# Patient Record
Sex: FEMALE | Race: WHITE | NOT HISPANIC OR LATINO | Employment: FULL TIME
[De-identification: names, ages, dates, MRNs, and addresses within clinical notes are randomized per-mention and may not be internally consistent; named-entity substitution may affect disease eponyms.]

---

## 2017-06-10 ENCOUNTER — HEALTH MAINTENANCE LETTER (OUTPATIENT)
Age: 27
End: 2017-06-10

## 2018-03-06 ENCOUNTER — APPOINTMENT (OUTPATIENT)
Dept: CT IMAGING | Facility: CLINIC | Age: 28
End: 2018-03-06
Attending: EMERGENCY MEDICINE
Payer: COMMERCIAL

## 2018-03-06 ENCOUNTER — APPOINTMENT (OUTPATIENT)
Dept: ULTRASOUND IMAGING | Facility: CLINIC | Age: 28
End: 2018-03-06
Attending: EMERGENCY MEDICINE
Payer: COMMERCIAL

## 2018-03-06 ENCOUNTER — HOSPITAL ENCOUNTER (EMERGENCY)
Facility: CLINIC | Age: 28
Discharge: HOME | End: 2018-03-06
Attending: EMERGENCY MEDICINE | Admitting: EMERGENCY MEDICINE
Payer: COMMERCIAL

## 2018-03-06 VITALS
OXYGEN SATURATION: 100 % | DIASTOLIC BLOOD PRESSURE: 74 MMHG | WEIGHT: 166 LBS | HEART RATE: 70 BPM | SYSTOLIC BLOOD PRESSURE: 121 MMHG | BODY MASS INDEX: 23.15 KG/M2 | RESPIRATION RATE: 16 BRPM | TEMPERATURE: 98.4 F

## 2018-03-06 DIAGNOSIS — R10.30 LOWER ABDOMINAL PAIN: ICD-10-CM

## 2018-03-06 LAB
ALBUMIN SERPL-MCNC: 4.1 G/DL (ref 3.4–5)
ALBUMIN UR-MCNC: NEGATIVE MG/DL
ALP SERPL-CCNC: 57 U/L (ref 40–150)
ALT SERPL W P-5'-P-CCNC: 17 U/L (ref 0–50)
ANION GAP SERPL CALCULATED.3IONS-SCNC: 7 MMOL/L (ref 3–14)
APPEARANCE UR: CLEAR
AST SERPL W P-5'-P-CCNC: 18 U/L (ref 0–45)
BASOPHILS # BLD AUTO: 0 10E9/L (ref 0–0.2)
BASOPHILS NFR BLD AUTO: 0.2 %
BILIRUB SERPL-MCNC: 0.4 MG/DL (ref 0.2–1.3)
BILIRUB UR QL STRIP: NEGATIVE
BUN SERPL-MCNC: 11 MG/DL (ref 7–30)
CALCIUM SERPL-MCNC: 8.7 MG/DL (ref 8.5–10.1)
CHLORIDE SERPL-SCNC: 107 MMOL/L (ref 94–109)
CO2 SERPL-SCNC: 29 MMOL/L (ref 20–32)
COLOR UR AUTO: ABNORMAL
CREAT SERPL-MCNC: 0.53 MG/DL (ref 0.52–1.04)
DIFFERENTIAL METHOD BLD: NORMAL
EOSINOPHIL # BLD AUTO: 0.1 10E9/L (ref 0–0.7)
EOSINOPHIL NFR BLD AUTO: 2.1 %
ERYTHROCYTE [DISTWIDTH] IN BLOOD BY AUTOMATED COUNT: 13 % (ref 10–15)
GFR SERPL CREATININE-BSD FRML MDRD: >90 ML/MIN/1.7M2
GLUCOSE SERPL-MCNC: 65 MG/DL (ref 70–99)
GLUCOSE UR STRIP-MCNC: NEGATIVE MG/DL
HCG SERPL QL: NEGATIVE
HCT VFR BLD AUTO: 43 % (ref 35–47)
HGB BLD-MCNC: 14.4 G/DL (ref 11.7–15.7)
HGB UR QL STRIP: ABNORMAL
IMM GRANULOCYTES # BLD: 0 10E9/L (ref 0–0.4)
IMM GRANULOCYTES NFR BLD: 0.2 %
KETONES UR STRIP-MCNC: NEGATIVE MG/DL
LEUKOCYTE ESTERASE UR QL STRIP: NEGATIVE
LIPASE SERPL-CCNC: 168 U/L (ref 73–393)
LYMPHOCYTES # BLD AUTO: 1.3 10E9/L (ref 0.8–5.3)
LYMPHOCYTES NFR BLD AUTO: 23.1 %
MCH RBC QN AUTO: 31.4 PG (ref 26.5–33)
MCHC RBC AUTO-ENTMCNC: 33.5 G/DL (ref 31.5–36.5)
MCV RBC AUTO: 94 FL (ref 78–100)
MONOCYTES # BLD AUTO: 0.4 10E9/L (ref 0–1.3)
MONOCYTES NFR BLD AUTO: 7.3 %
MUCOUS THREADS #/AREA URNS LPF: PRESENT /LPF
NEUTROPHILS # BLD AUTO: 3.9 10E9/L (ref 1.6–8.3)
NEUTROPHILS NFR BLD AUTO: 67.1 %
NITRATE UR QL: NEGATIVE
NRBC # BLD AUTO: 0 10*3/UL
NRBC BLD AUTO-RTO: 0 /100
PH UR STRIP: 5.5 PH (ref 5–7)
PLATELET # BLD AUTO: 271 10E9/L (ref 150–450)
POTASSIUM SERPL-SCNC: 3.9 MMOL/L (ref 3.4–5.3)
PROT SERPL-MCNC: 8.6 G/DL (ref 6.8–8.8)
RBC # BLD AUTO: 4.58 10E12/L (ref 3.8–5.2)
RBC #/AREA URNS AUTO: 17 /HPF (ref 0–2)
SODIUM SERPL-SCNC: 143 MMOL/L (ref 133–144)
SOURCE: ABNORMAL
SP GR UR STRIP: 1.01 (ref 1–1.03)
SPECIMEN SOURCE: NORMAL
SQUAMOUS #/AREA URNS AUTO: 2 /HPF (ref 0–1)
UROBILINOGEN UR STRIP-MCNC: NORMAL MG/DL (ref 0–2)
WBC # BLD AUTO: 5.8 10E9/L (ref 4–11)
WBC #/AREA URNS AUTO: 1 /HPF (ref 0–5)
WET PREP SPEC: NORMAL

## 2018-03-06 PROCEDURE — 87591 N.GONORRHOEAE DNA AMP PROB: CPT | Performed by: EMERGENCY MEDICINE

## 2018-03-06 PROCEDURE — 96375 TX/PRO/DX INJ NEW DRUG ADDON: CPT | Performed by: EMERGENCY MEDICINE

## 2018-03-06 PROCEDURE — 84703 CHORIONIC GONADOTROPIN ASSAY: CPT | Performed by: EMERGENCY MEDICINE

## 2018-03-06 PROCEDURE — 99284 EMERGENCY DEPT VISIT MOD MDM: CPT | Mod: 25 | Performed by: EMERGENCY MEDICINE

## 2018-03-06 PROCEDURE — 74177 CT ABD & PELVIS W/CONTRAST: CPT

## 2018-03-06 PROCEDURE — 99284 EMERGENCY DEPT VISIT MOD MDM: CPT | Mod: Z6 | Performed by: EMERGENCY MEDICINE

## 2018-03-06 PROCEDURE — 87210 SMEAR WET MOUNT SALINE/INK: CPT | Performed by: EMERGENCY MEDICINE

## 2018-03-06 PROCEDURE — 83690 ASSAY OF LIPASE: CPT | Performed by: EMERGENCY MEDICINE

## 2018-03-06 PROCEDURE — 96374 THER/PROPH/DIAG INJ IV PUSH: CPT | Mod: 59 | Performed by: EMERGENCY MEDICINE

## 2018-03-06 PROCEDURE — 87491 CHLMYD TRACH DNA AMP PROBE: CPT | Performed by: EMERGENCY MEDICINE

## 2018-03-06 PROCEDURE — 80053 COMPREHEN METABOLIC PANEL: CPT | Performed by: EMERGENCY MEDICINE

## 2018-03-06 PROCEDURE — 25000125 ZZHC RX 250: Performed by: EMERGENCY MEDICINE

## 2018-03-06 PROCEDURE — 25000128 H RX IP 250 OP 636: Performed by: EMERGENCY MEDICINE

## 2018-03-06 PROCEDURE — 81001 URINALYSIS AUTO W/SCOPE: CPT | Performed by: EMERGENCY MEDICINE

## 2018-03-06 PROCEDURE — 85025 COMPLETE CBC W/AUTO DIFF WBC: CPT | Performed by: EMERGENCY MEDICINE

## 2018-03-06 PROCEDURE — 93976 VASCULAR STUDY: CPT

## 2018-03-06 RX ORDER — ONDANSETRON 2 MG/ML
4 INJECTION INTRAMUSCULAR; INTRAVENOUS ONCE
Status: COMPLETED | OUTPATIENT
Start: 2018-03-06 | End: 2018-03-06

## 2018-03-06 RX ORDER — IOPAMIDOL 755 MG/ML
100 INJECTION, SOLUTION INTRAVASCULAR ONCE
Status: COMPLETED | OUTPATIENT
Start: 2018-03-06 | End: 2018-03-06

## 2018-03-06 RX ORDER — KETOROLAC TROMETHAMINE 30 MG/ML
30 INJECTION, SOLUTION INTRAMUSCULAR; INTRAVENOUS ONCE
Status: COMPLETED | OUTPATIENT
Start: 2018-03-06 | End: 2018-03-06

## 2018-03-06 RX ADMIN — KETOROLAC TROMETHAMINE 30 MG: 30 INJECTION, SOLUTION INTRAMUSCULAR at 17:25

## 2018-03-06 RX ADMIN — IOPAMIDOL 81 ML: 755 INJECTION, SOLUTION INTRAVENOUS at 19:27

## 2018-03-06 RX ADMIN — SODIUM CHLORIDE 1000 ML: 9 INJECTION, SOLUTION INTRAVENOUS at 19:57

## 2018-03-06 RX ADMIN — ONDANSETRON 4 MG: 2 INJECTION INTRAMUSCULAR; INTRAVENOUS at 17:25

## 2018-03-06 RX ADMIN — SODIUM CHLORIDE 20 ML: 9 INJECTION, SOLUTION INTRAVENOUS at 19:27

## 2018-03-06 ASSESSMENT — ENCOUNTER SYMPTOMS
FEVER: 0
NAUSEA: 1
DIFFICULTY URINATING: 0
NECK STIFFNESS: 0
SHORTNESS OF BREATH: 0
HEADACHES: 0
ARTHRALGIAS: 0
ABDOMINAL PAIN: 1
CONFUSION: 0
EYE REDNESS: 0
COLOR CHANGE: 0

## 2018-03-06 NOTE — ED PROVIDER NOTES
"  History     Chief Complaint   Patient presents with     Abdominal Pain     Onset one hour ago while sitting at work with lower abdominal pain, \"I am on day 2 of my period and I have a history of ovarian cysts\" nausea and feeling hot.     HPI  Elmira Craft is a 27 year old female who presents to the emergency department with lower abdominal pain.  Patient states that she has had lower abdominal pain since she awoke this morning.  It became worse approximately 1-1/2 hours prior to presentation.  She did have some nausea but denies any vomiting.  She also reports that she felt flushed earlier today.  Patient states that she had 2 soft bowel movements today.  She denies any fever.  Patient denies any cough or dyspnea.  She did take a pill for a vaginal yeast infection yesterday.  She is uncertain of the name.  Patient denies any dysuria, urgency, or frequency.  She states that she is currently menstruating.  She has a history of ovarian cyst and cystectomy previously.  She denies any other abdominal surgery.    I have reviewed the Medications, Allergies, Past Medical and Surgical History, and Social History in the Epic system.    Review of Systems   Constitutional: Negative for fever.   HENT: Negative for congestion.    Eyes: Negative for redness.   Respiratory: Negative for shortness of breath.    Cardiovascular: Negative for chest pain.   Gastrointestinal: Positive for abdominal pain and nausea.   Genitourinary: Negative for difficulty urinating.   Musculoskeletal: Negative for arthralgias and neck stiffness.   Skin: Negative for color change.   Neurological: Negative for headaches.   Psychiatric/Behavioral: Negative for confusion.   All other systems reviewed and are negative.      Physical Exam   BP: 131/83  Pulse: 70  Heart Rate: 70  Temp: 96.2  F (35.7  C)  Resp: 16  Weight: 75.3 kg (166 lb)  SpO2: 100 %      Physical Exam   Constitutional: She is oriented to person, place, and time. She appears " well-developed and well-nourished. No distress.   HENT:   Head: Atraumatic.   Mouth/Throat: No oropharyngeal exudate.   Eyes: No scleral icterus.   Neck: Normal range of motion. Neck supple.   Cardiovascular: Normal rate, regular rhythm, normal heart sounds and intact distal pulses.    Pulmonary/Chest: Effort normal and breath sounds normal. No respiratory distress.   Abdominal: Soft. Bowel sounds are normal. There is tenderness. There is no rigidity and no guarding.       Genitourinary: Vagina normal and uterus normal. Cervix exhibits no motion tenderness. Right adnexum displays tenderness. Right adnexum displays no mass and no fullness. Left adnexum displays fullness. Left adnexum displays no mass and no tenderness.   Musculoskeletal: Normal range of motion. She exhibits no edema or tenderness.   Neurological: She is alert and oriented to person, place, and time. She has normal strength. Coordination normal.   Skin: Skin is warm. No rash noted. She is not diaphoretic.   Psychiatric: She has a normal mood and affect. Her behavior is normal.   Nursing note and vitals reviewed.      ED Course     ED Course     Procedures            Critical Care time:    Results for orders placed or performed during the hospital encounter of 03/06/18 (from the past 24 hour(s))   CBC with platelets differential   Result Value Ref Range    WBC 5.8 4.0 - 11.0 10e9/L    RBC Count 4.58 3.8 - 5.2 10e12/L    Hemoglobin 14.4 11.7 - 15.7 g/dL    Hematocrit 43.0 35.0 - 47.0 %    MCV 94 78 - 100 fl    MCH 31.4 26.5 - 33.0 pg    MCHC 33.5 31.5 - 36.5 g/dL    RDW 13.0 10.0 - 15.0 %    Platelet Count 271 150 - 450 10e9/L    Diff Method Automated Method     % Neutrophils 67.1 %    % Lymphocytes 23.1 %    % Monocytes 7.3 %    % Eosinophils 2.1 %    % Basophils 0.2 %    % Immature Granulocytes 0.2 %    Nucleated RBCs 0 0 /100    Absolute Neutrophil 3.9 1.6 - 8.3 10e9/L    Absolute Lymphocytes 1.3 0.8 - 5.3 10e9/L    Absolute Monocytes 0.4 0.0 - 1.3  10e9/L    Absolute Eosinophils 0.1 0.0 - 0.7 10e9/L    Absolute Basophils 0.0 0.0 - 0.2 10e9/L    Abs Immature Granulocytes 0.0 0 - 0.4 10e9/L    Absolute Nucleated RBC 0.0    Comprehensive metabolic panel   Result Value Ref Range    Sodium 143 133 - 144 mmol/L    Potassium 3.9 3.4 - 5.3 mmol/L    Chloride 107 94 - 109 mmol/L    Carbon Dioxide 29 20 - 32 mmol/L    Anion Gap 7 3 - 14 mmol/L    Glucose 65 (L) 70 - 99 mg/dL    Urea Nitrogen 11 7 - 30 mg/dL    Creatinine 0.53 0.52 - 1.04 mg/dL    GFR Estimate >90 >60 mL/min/1.7m2    GFR Estimate If Black >90 >60 mL/min/1.7m2    Calcium 8.7 8.5 - 10.1 mg/dL    Bilirubin Total 0.4 0.2 - 1.3 mg/dL    Albumin 4.1 3.4 - 5.0 g/dL    Protein Total 8.6 6.8 - 8.8 g/dL    Alkaline Phosphatase 57 40 - 150 U/L    ALT 17 0 - 50 U/L    AST 18 0 - 45 U/L   Lipase   Result Value Ref Range    Lipase 168 73 - 393 U/L   HCG qualitative pregnancy (blood)   Result Value Ref Range    HCG Qualitative Serum Negative NEG^Negative   UA with Microscopic reflex to Culture   Result Value Ref Range    Color Urine Light Yellow     Appearance Urine Clear     Glucose Urine Negative NEG^Negative mg/dL    Bilirubin Urine Negative NEG^Negative    Ketones Urine Negative NEG^Negative mg/dL    Specific Gravity Urine 1.006 1.003 - 1.035    Blood Urine Moderate (A) NEG^Negative    pH Urine 5.5 5.0 - 7.0 pH    Protein Albumin Urine Negative NEG^Negative mg/dL    Urobilinogen mg/dL Normal 0.0 - 2.0 mg/dL    Nitrite Urine Negative NEG^Negative    Leukocyte Esterase Urine Negative NEG^Negative    Source Midstream Urine     WBC Urine 1 0 - 5 /HPF    RBC Urine 17 (H) 0 - 2 /HPF    Squamous Epithelial /HPF Urine 2 (H) 0 - 1 /HPF    Mucous Urine Present (A) NEG^Negative /LPF   Wet prep   Result Value Ref Range    Specimen Description Vagina     Wet Prep No motile Trichomonas seen     Wet Prep No yeast seen     Wet Prep No PMNs seen     Wet Prep No clue cells seen    US Pelvic Complete w Transvaginal & Abd/Pel Duplex  Limited    Narrative    PELVIC ULTRASOUND WITH ENDOVAGINAL TRANSDUCER    3/6/2018 6:24 PM     HISTORY: Right adnexal pain. Evaluate for torsion.    TECHNIQUE: Endovaginal sonography was added to the transabdominal exam  to better evaluate the uterus and ovaries.    COMPARISON: Pelvic ultrasound 3/1/2016.    FINDINGS: Endometrium is 1 mm thick. Uterus is 5.7 x 4.4 x 2.6 cm.  Ovaries are symmetric in size containing multiple small follicles.  Color Doppler spectral analysis of each ovary shows normal waveforms.  Trace pelvic fluid.      Impression    IMPRESSION: Trace pelvic fluid. No specific acute abnormality.   CT Abdomen Pelvis w Contrast    Narrative    CT ABDOMEN PELVIS WITH CONTRAST   3/6/2018 7:26 PM     HISTORY: Right lower quadrant pain. Evaluate for appendicitis.     TECHNIQUE: CT abdomen and pelvis with 81 mL Isovue-370 IV. Radiation  dose for this scan was reduced using automated exposure control,  adjustment of the mA and/or kV according to patient size, or iterative  reconstruction technique.    COMPARISON: None.    FINDINGS: The appendix is normal. No acute inflammatory change of the  large or small bowel identified. There is no abscess or free air.  Liver, adrenals, spleen, pancreas, and kidneys enhance homogeneously.  Contracted gallbladder.      Impression    IMPRESSION: No acute abnormality is seen. The appendix appears normal.         Medications   0.9% sodium chloride BOLUS (1,000 mLs Intravenous New Bag 3/6/18 1957)   ketorolac (TORADOL) injection 30 mg (30 mg Intravenous Given 3/6/18 1725)   ondansetron (ZOFRAN) injection 4 mg (4 mg Intravenous Given 3/6/18 1725)   iopamidol (ISOVUE-370) solution 100 mL (81 mLs Intravenous Given 3/6/18 1927)   sodium chloride 0.9 % bag 500mL for CT scan flush use (20 mLs As instructed Given 3/6/18 1927)      7:02 PM Repeat exam- Abdomen soft, mild tenderness in the right lower quadrant without guarding.  Discussed results thus far.  Discussed options of repeat  exam in the morning if ongoing right lower quadrant pain for consideration of early appendicitis versus CT imaging now.  As patient's pain now local to right lower quadrant, CT imaging appears reasonable and indicated.  Patient prefers CT scan at this time.         Assessments & Plan (with Medical Decision Making)   27 year old female to emergency room with 1 day history of lower abdominal pain.  Patient had some associated nausea but no vomiting.  Her physical examination is remarkable for some diffuse lower abdominal tenderness initially without peritoneal signs.  She did have some left adnexal fullness and right adnexal tenderness on pelvic exam shows ultrasound was obtained and revealed no acute ovarian pathology.  Her labs and urinalysis are normal.  During the patient's emergency department course, her pain localized to the right side although overall she felt improved after Toradol and fluids.  Because of this localization, patient elected to pursue CT scan which was ultimately normal.  Patient be discharged home.  Over-the-counter analgesics recommended.  Return precautions discussed.  Primary care follow-up recommended    I have reviewed the nursing notes.    I have reviewed the findings, diagnosis, plan and need for follow up with the patient.    New Prescriptions    No medications on file       Final diagnoses:   Lower abdominal pain       3/6/2018   [facility] [place], EMERGENCY DEPARTMENT     Hardeep Siomn MD  03/06/18 [id number]

## 2018-03-06 NOTE — ED AVS SNAPSHOT
[facility], [place], Emergency Department    [address]    [facility] [address]    Phone:  [phone]    Fax:  [phone]                                       Elmira Craft   MRN: [id number]    Department:  [facility], Emergency Department   Date of Visit:  3/6/2018           After Visit Summary Signature Page     I have received my discharge instructions, and my questions have been answered. I have discussed any challenges I see with this plan with the nurse or doctor.    ..........................................................................................................................................  Patient/Patient Representative Signature      ..........................................................................................................................................  Patient Representative Print Name and Relationship to Patient    ..................................................               ................................................  Date                                            Time    ..........................................................................................................................................  Reviewed by Signature/Title    ...................................................              ..............................................  Date                                                            Time

## 2018-03-06 NOTE — ED AVS SNAPSHOT
[facility], Emergency Department    [address]    [place] [address]    Phone:  [phone]    Fax:  [phone]                                       Elmira Craft   MRN: [id number]    Department:  [facility], Emergency Department   Date of Visit:  3/6/2018           Patient Information     Date Of Birth          1990        Your diagnoses for this visit were:     Lower abdominal pain        You were seen by Hardeep Simon MD.        Discharge Instructions       You may take ibuprofen 600 mg every 6 hours with food or acetaminophen as needed for pain.  Drink plenty of fluids.    Please make an appointment to follow up with Your Primary Care Provider in the next few days if not better.     Discharge References/Attachments     ABDOMINAL PAIN, UNKNOWN CAUSE, (FEMALE) (ENGLISH)      24 Hour Appointment Hotline       To make an appointment at any [facility] clinic, call [phone] ([phone]). If you don't have a family doctor or clinic, we will help you find one. [facility] clinics are conveniently located to serve the needs of you and your family.             Review of your medicines      Our records show that you are taking the medicines listed below. If these are incorrect, please call your family doctor or clinic.        Dose / Directions Last dose taken    EXCEDRIN MIGRAINE PO        Take  by mouth as needed.   Refills:  0                Procedures and tests performed during your visit     CBC with platelets differential    CT Abdomen Pelvis w Contrast    Chlamydia trachomatis PCR    Comprehensive metabolic panel    HCG qualitative pregnancy (blood)    Lipase    NO Rho (D) immune globulin (RhoGam) needed - NOT obstetric patient    Neisseria gonorrhoea PCR    Peripheral IV catheter    Prep for procedure - pelvic exam    UA with Microscopic reflex to Culture    US Pelvic Complete w Transvaginal & Abd/Pel Duplex Limited    Wet prep      Orders Needing Specimen Collection     None       Pending Results     Date and Time Order Name Status Description    3/6/2018 1902 CT Abdomen Pelvis w Contrast Preliminary     3/6/2018 1703 US Pelvic Complete w Transvaginal & Abd/Pel Duplex Limited Preliminary     3/6/2018 1557 Neisseria gonorrhoea PCR In process     3/6/2018 1557 Chlamydia trachomatis PCR In process             Pending Culture Results     Date and Time Order Name Status Description    3/6/2018 1557 Neisseria gonorrhoea PCR In process     3/6/2018 1557 Chlamydia trachomatis PCR In process             Pending Results Instructions     If you had any lab results that were not finalized at the time of your Discharge, you can call the ED Lab Result RN at [phone]. You will be contacted by this team for any positive Lab results or changes in treatment. The nurses are available 7 days a week from 10A to 6:30P.  You can leave a message 24 hours per day and they will return your call.        Thank you for choosing [facility]       Thank you for choosing [facility] for your care. Our goal is always to provide you with excellent care. Hearing back from our patients is one way we can continue to improve our services. Please take a few minutes to complete the written survey that you may receive in the mail after you visit with us. Thank you!        [employer] Information     [employer] gives you secure access to your electronic health record. If you see a primary care provider, you can also send messages to your care team and make appointments. If you have questions, please call your primary care clinic.  If you do not have a primary care provider, please call [phone] and they will assist you.        Care EveryWhere ID     This is your Care EveryWhere ID. This could be used by other organizations to access your [facility] medical records  [id number]        Equal Access to Services     REMIGIO ARGUELLO AH: Herminia Shrestha, twila steen, sd mayers  la'lorena kendell. So [facility] [phone].    ATENCIÓN: Si habla español, tiene a cummins disposición servicios gratuitos de asistencia lingüística. Llame al [phone].    We comply with applicable federal civil rights laws and [place] laws. We do not discriminate on the basis of race, color, national origin, age, disability, sex, sexual orientation, or gender identity.            After Visit Summary       This is your record. Keep this with you and show to your community pharmacist(s) and doctor(s) at your next visit.

## 2018-03-06 NOTE — ED NOTES
Pt had surgery for ovarian cyst removal in 2016 but never f/u afterward.  She has diffuse bilateral abdominal pain.  States this pain is different and feels a lot of pressure and bloating.  Also, she is on 2nd day of period and uses a menstrual cup which is in vagina right now.    [facility] and was prescribed something for a yeast infection but she is not sure name of the pill.

## 2018-03-07 LAB
C TRACH DNA SPEC QL NAA+PROBE: NEGATIVE
N GONORRHOEA DNA SPEC QL NAA+PROBE: NEGATIVE
SPECIMEN SOURCE: NORMAL
SPECIMEN SOURCE: NORMAL

## 2018-03-07 NOTE — DISCHARGE INSTRUCTIONS
You may take ibuprofen 600 mg every 6 hours with food or acetaminophen as needed for pain.  Drink plenty of fluids.    Please make an appointment to follow up with Your Primary Care Provider in the next few days if not better.

## 2018-07-23 ENCOUNTER — OFFICE VISIT (OUTPATIENT)
Dept: FAMILY MEDICINE | Facility: CLINIC | Age: 28
End: 2018-07-23
Payer: COMMERCIAL

## 2018-07-23 VITALS
TEMPERATURE: 98.6 F | WEIGHT: 160 LBS | DIASTOLIC BLOOD PRESSURE: 80 MMHG | SYSTOLIC BLOOD PRESSURE: 123 MMHG | RESPIRATION RATE: 16 BRPM | BODY MASS INDEX: 22.4 KG/M2 | HEART RATE: 86 BPM | HEIGHT: 71 IN | OXYGEN SATURATION: 99 %

## 2018-07-23 DIAGNOSIS — N76.0 BACTERIAL VAGINOSIS: Primary | ICD-10-CM

## 2018-07-23 DIAGNOSIS — B96.89 BACTERIAL VAGINOSIS: Primary | ICD-10-CM

## 2018-07-23 LAB
ALBUMIN UR-MCNC: NEGATIVE MG/DL
APPEARANCE UR: CLEAR
BACTERIA #/AREA URNS HPF: ABNORMAL /HPF
BILIRUB UR QL STRIP: NEGATIVE
COLOR UR AUTO: YELLOW
GLUCOSE UR STRIP-MCNC: NEGATIVE MG/DL
HGB UR QL STRIP: NEGATIVE
KETONES UR STRIP-MCNC: NEGATIVE MG/DL
LEUKOCYTE ESTERASE UR QL STRIP: NEGATIVE
NITRATE UR QL: NEGATIVE
NON-SQ EPI CELLS #/AREA URNS LPF: ABNORMAL /LPF
PH UR STRIP: 6 PH (ref 5–7)
RBC #/AREA URNS AUTO: ABNORMAL /HPF
SOURCE: ABNORMAL
SP GR UR STRIP: 1.02 (ref 1–1.03)
SPECIMEN SOURCE: ABNORMAL
UROBILINOGEN UR STRIP-ACNC: 0.2 EU/DL (ref 0.2–1)
WBC #/AREA URNS AUTO: ABNORMAL /HPF
WET PREP SPEC: ABNORMAL

## 2018-07-23 PROCEDURE — 87210 SMEAR WET MOUNT SALINE/INK: CPT | Performed by: FAMILY MEDICINE

## 2018-07-23 PROCEDURE — 99213 OFFICE O/P EST LOW 20 MIN: CPT | Performed by: FAMILY MEDICINE

## 2018-07-23 PROCEDURE — 81001 URINALYSIS AUTO W/SCOPE: CPT | Performed by: FAMILY MEDICINE

## 2018-07-23 RX ORDER — METRONIDAZOLE 7.5 MG/G
1 GEL VAGINAL AT BEDTIME
Qty: 35 G | Refills: 0 | Status: SHIPPED | OUTPATIENT
Start: 2018-07-23 | End: 2019-02-13

## 2018-07-23 NOTE — MR AVS SNAPSHOT
"              After Visit Summary   7/23/2018    Elmira Craft    MRN: [id number]           Patient Information     Date Of Birth          1990        Visit Information        Provider Department      7/23/2018 4:20 PM Judy Carmona MD [facility]        Today's Diagnoses     Bacterial vaginosis    -  1       Follow-ups after your visit        Who to contact     If you have questions or need follow up information about today's clinic visit or your schedule please contact [facility] directly at [phone].  Normal or non-critical lab and imaging results will be communicated to you by [employer], letter or phone within 4 business days after the clinic has received the results. If you do not hear from us within 7 days, please contact the clinic through [employer] or phone. If you have a critical or abnormal lab result, we will notify you by phone as soon as possible.  Submit refill requests through [employer] or call your pharmacy and they will forward the refill request to us. Please allow 3 business days for your refill to be completed.          Additional Information About Your Visit        MyChart Information     [employer] gives you secure access to your electronic health record. If you see a primary care provider, you can also send messages to your care team and make appointments. If you have questions, please call your primary care clinic.  If you do not have a primary care provider, please call [phone] and they will assist you.        Care EveryWhere ID     This is your Care EveryWhere ID. This could be used by other organizations to access your [facility] medical records  [id number]        Your Vitals Were     Pulse Temperature Respirations Height Last Period Pulse Oximetry    86 98.6  F (37  C) (Oral) 16 5' 11\" (1.803 m) 07/16/2018 99%    Breastfeeding? BMI (Body Mass Index)                No 22.32 kg/m2           Blood Pressure from Last 3 Encounters:   07/23/18 123/80 "   03/06/18 121/74   03/25/16 119/62    Weight from Last 3 Encounters:   07/23/18 160 lb (72.6 kg)   03/06/18 166 lb (75.3 kg)   03/25/16 170 lb (77.1 kg)              We Performed the Following     UA with Microscopic reflex to Culture     Wet prep          Today's Medication Changes          These changes are accurate as of 7/23/18  5:12 PM.  If you have any questions, ask your nurse or doctor.               Start taking these medicines.        Dose/Directions    metroNIDAZOLE 0.75 % vaginal gel   Commonly known as:  METROGEL   Used for:  Bacterial vaginosis   Started by:  Judy Carmona MD        Dose:  1 applicator   Place 1 applicator (5 g) vaginally At Bedtime for 7 days   Quantity:  35 g   Refills:  0            Where to get your medicines      These medications were sent to [facility] [address] [address] [address] [facility] [address]  [address] [facility] [address]     Phone:  [phone]     metroNIDAZOLE 0.75 % vaginal gel                Primary Care Provider Office Phone # Fax #    [facility] [phone] [address]  [facility] [address]        Equal Access to Services     BRANDT ARGUELLO AH: Hadii faviola mathews hadasho Soomaali, waaxda luqadaha, qaybta kaalmada adeegyada, waxay idiin hayredn mary kay rdz. So [facility] [phone].    ATENCIÓN: Si habla español, tiene a cummins disposición servicios gratuitos de asistencia lingüística. Llame al [phone].    We comply with applicable federal civil rights laws and [place] laws. We do not discriminate on the basis of race, color, national origin, age, disability, sex, sexual orientation, or gender identity.            Thank you!     Thank you for choosing [facility]  for your care. Our goal is always to provide you with excellent care. Hearing back from our patients is one way we can continue to improve our services. Please take a few minutes to  complete the written survey that you may receive in the mail after your visit with us. Thank you!             Your Updated Medication List - Protect others around you: Learn how to safely use, store and throw away your medicines at www.disposemymeds.org.          This list is accurate as of 7/23/18  5:12 PM.  Always use your most recent med list.                   Brand Name Dispense Instructions for use Diagnosis    EXCEDRIN MIGRAINE PO      Take  by mouth as needed.        metroNIDAZOLE 0.75 % vaginal gel    METROGEL    35 g    Place 1 applicator (5 g) vaginally At Bedtime for 7 days    Bacterial vaginosis

## 2018-07-23 NOTE — PROGRESS NOTES
"  SUBJECTIVE:   Elmira Craft is a 27 year old female who presents to clinic today for the following health issues:      ED/UC Followup:    [facility]  Date of visit: 7/5/2018  Reason for visit: Urinary symptoms  Current Status: still doesn't feel like everything is healed.   Vague urinary symptoms   [facility]  Had tests done - all negative  Still feels something is not healed , some pelvic discomfort on and off  History of ovaraian cyst in past- had recent CT pelvis & ultrasound all negative     Reviewed and updated as needed this visit by clinical staff  Tobacco  Allergies  Meds  Med Hx  Surg Hx  Fam Hx  Soc Hx      Reviewed and updated as needed this visit by Provider         ROS:  Constitutional, HEENT, cardiovascular, pulmonary, gi and gu systems are negative, except as otherwise noted.    OBJECTIVE:     /80  Pulse 86  Temp 98.6  F (37  C) (Oral)  Resp 16  Ht 5' 11\" (1.803 m)  Wt 160 lb (72.6 kg)  LMP 07/16/2018  SpO2 99%  Breastfeeding? No  BMI 22.32 kg/m2  Body mass index is 22.32 kg/(m^2).  GENERAL: healthy, alert and no distress  RESP: lungs clear to auscultation - no rales, rhonchi or wheezes  CV: regular rates and rhythm  ABDOMEN: soft, nontender, no hepatosplenomegaly, no masses and bowel sounds normal  SKIN: no suspicious lesions or rashes  PSYCH: mentation appears normal, affect normal/bright    Diagnostic Test Results:  Results for orders placed or performed in visit on 07/23/18 (from the past 24 hour(s))   UA with Microscopic reflex to Culture   Result Value Ref Range    Color Urine Yellow     Appearance Urine Clear     Glucose Urine Negative NEG^Negative mg/dL    Bilirubin Urine Negative NEG^Negative    Ketones Urine Negative NEG^Negative mg/dL    Specific Gravity Urine 1.025 1.003 - 1.035    pH Urine 6.0 5.0 - 7.0 pH    Protein Albumin Urine Negative NEG^Negative mg/dL    Urobilinogen Urine 0.2 0.2 - " 1.0 EU/dL    Nitrite Urine Negative NEG^Negative    Blood Urine Negative NEG^Negative    Leukocyte Esterase Urine Negative NEG^Negative    Source Midstream Urine     WBC Urine 0 - 5 OTO5^0 - 5 /HPF    RBC Urine O - 2 OTO2^O - 2 /HPF    Squamous Epithelial /LPF Urine Moderate (A) FEW^Few /LPF    Bacteria Urine Moderate (A) NEG^Negative /HPF   Wet prep   Result Value Ref Range    Specimen Description Vagina     Wet Prep No Trichomonas seen     Wet Prep Clue cells seen (A)     Wet Prep No yeast seen        ASSESSMENT/PLAN:   1. Bacterial vaginosis  - metroNIDAZOLE (METROGEL) 0.75 % vaginal gel; Place 1 applicator (5 g) vaginally At Bedtime for 7 days  Dispense: 35 g; Refill: 0    Everything looks good except they did see 'clue cells' on the vaginal swab.  This CAN be a sign of bacterial vaginosis, which is more of a shift in the vaginal jack than an actual infection (and it is NOT an STD).  We usually only treat it if you are having increased vaginal discharge, vaginal irritation, or a change in your vaginal odor.  It is one of the most common cause of vaginitis.  If you are having symptoms- .I do recommend treatment with metronidazole vaginally  Once daily 7 days    Approximately 50 to 75 percent of women with BV are asymptomatic  Follow up as needed          Also discussed in detail if abnormal periods- follow up as needed   She reports she is not pregnant and had home urine pregnancy test negative   She is over due for her annual pap and physical appointment and is encouraged to make a separate appointment regarding that.            Judy Carmona MD  [facility]

## 2018-08-30 ENCOUNTER — OFFICE VISIT (OUTPATIENT)
Dept: FAMILY MEDICINE | Facility: CLINIC | Age: 28
End: 2018-08-30
Payer: COMMERCIAL

## 2018-08-30 VITALS
TEMPERATURE: 98.7 F | WEIGHT: 162.3 LBS | OXYGEN SATURATION: 97 % | HEART RATE: 70 BPM | BODY MASS INDEX: 22.72 KG/M2 | DIASTOLIC BLOOD PRESSURE: 81 MMHG | HEIGHT: 71 IN | SYSTOLIC BLOOD PRESSURE: 118 MMHG

## 2018-08-30 DIAGNOSIS — Q83.3 ACCESSORY NIPPLE IN FEMALE: ICD-10-CM

## 2018-08-30 DIAGNOSIS — Z12.4 SCREENING FOR CERVICAL CANCER: ICD-10-CM

## 2018-08-30 DIAGNOSIS — G43.709 CHRONIC MIGRAINE WITHOUT AURA WITHOUT STATUS MIGRAINOSUS, NOT INTRACTABLE: ICD-10-CM

## 2018-08-30 DIAGNOSIS — Z00.00 ROUTINE GENERAL MEDICAL EXAMINATION AT A HEALTH CARE FACILITY: Primary | ICD-10-CM

## 2018-08-30 PROCEDURE — 99395 PREV VISIT EST AGE 18-39: CPT | Performed by: FAMILY MEDICINE

## 2018-08-30 PROCEDURE — G0145 SCR C/V CYTO,THINLAYER,RESCR: HCPCS | Performed by: FAMILY MEDICINE

## 2018-08-30 NOTE — MR AVS SNAPSHOT
After Visit Summary   8/30/2018    Elmira Craft    MRN: [id number]           Patient Information     Date Of Birth          1990        Visit Information        Provider Department      8/30/2018 8:20 AM Judy Carmona MD [facility]        Today's Diagnoses     Routine general medical examination at a health care facility    -  1    Chronic migraine without aura without status migrainosus, not intractable        Accessory nipple in female        Screening for cervical cancer          Care Instructions      Preventive Health Recommendations  Female Ages 26 - 39  Yearly exam:   See your health care provider every year in order to    Review health changes.     Discuss preventive care.      Review your medicines if you your doctor has prescribed any.    Until age 30: Get a Pap test every three years (more often if you have had an abnormal result).    After age 30: Talk to your doctor about whether you should have a Pap test every 3 years or have a Pap test with HPV screening every 5 years.   You do not need a Pap test if your uterus was removed (hysterectomy) and you have not had cancer.  You should be tested each year for STDs (sexually transmitted diseases), if you're at risk.   Talk to your provider about how often to have your cholesterol checked.  If you are at risk for diabetes, you should have a diabetes test (fasting glucose).  Shots: Get a flu shot each year. Get a tetanus shot every 10 years.   Nutrition:     Eat at least 5 servings of fruits and vegetables each day.    Eat whole-grain bread, whole-wheat pasta and brown rice instead of white grains and rice.    Get adequate Calcium and Vitamin D.     Lifestyle    Exercise at least 150 minutes a week (30 minutes a day, 5 days of the week). This will help you control your weight and prevent disease.    Limit alcohol to one drink per day.    No smoking.     Wear sunscreen to prevent skin cancer.    See your dentist every  "six months for an exam and cleaning.            Follow-ups after your visit        Follow-up notes from your care team     Return in about 1 year (around 8/30/2019) for Routine Visit.      Who to contact     If you have questions or need follow up information about today's clinic visit or your schedule please contact [facility] directly at [phone].  Normal or non-critical lab and imaging results will be communicated to you by [employer], letter or phone within 4 business days after the clinic has received the results. If you do not hear from us within 7 days, please contact the clinic through [employer] or phone. If you have a critical or abnormal lab result, we will notify you by phone as soon as possible.  Submit refill requests through [employer] or call your pharmacy and they will forward the refill request to us. Please allow 3 business days for your refill to be completed.          Additional Information About Your Visit        [employer] Information     [employer] gives you secure access to your electronic health record. If you see a primary care provider, you can also send messages to your care team and make appointments. If you have questions, please call your primary care clinic.  If you do not have a primary care provider, please call [phone] and they will assist you.        Care EveryWhere ID     This is your Care EveryWhere ID. This could be used by other organizations to access your [facility] medical records  [id number]        Your Vitals Were     Pulse Temperature Height Last Period Pulse Oximetry BMI (Body Mass Index)    70 98.7  F (37.1  C) (Oral) 5' 11\" (1.803 m) 08/05/2018 97% 22.64 kg/m2       Blood Pressure from Last 3 Encounters:   08/30/18 118/81   07/23/18 123/80   03/06/18 121/74    Weight from Last 3 Encounters:   08/30/18 162 lb 4.8 oz (73.6 kg)   07/23/18 160 lb (72.6 kg)   03/06/18 166 lb (75.3 kg)              We Performed the Following     Pap imaged thin layer screen reflex to " HPV if ASCUS - recommend age 25 - 29        Primary Care Provider Office Phone # Fax #    [facility] [phone] [address]  [facility] [address]        Equal Access to Services     BRANDT ARGUELLO : Hadii aad ku hadasho Soomaali, waaxda luqadaha, qaybta kaalmada adeegyada, waxay idiin hayaan adesay grant laerika rdz. So [facility] [phone].    ATENCIÓN: Si habla español, tiene a cummins disposición servicios gratuitos de asistencia lingüística. Llame al [phone].    We comply with applicable federal civil rights laws and [place] laws. We do not discriminate on the basis of race, color, national origin, age, disability, sex, sexual orientation, or gender identity.            Thank you!     Thank you for choosing [facility]  for your care. Our goal is always to provide you with excellent care. Hearing back from our patients is one way we can continue to improve our services. Please take a few minutes to complete the written survey that you may receive in the mail after your visit with us. Thank you!             Your Updated Medication List - Protect others around you: Learn how to safely use, store and throw away your medicines at www.disposemymeds.org.          This list is accurate as of 8/30/18  8:57 AM.  Always use your most recent med list.                   Brand Name Dispense Instructions for use Diagnosis    EXCEDRIN MIGRAINE PO      Take  by mouth as needed.

## 2018-08-30 NOTE — NURSING NOTE
"Chief Complaint   Patient presents with     Physical     /81  Pulse 70  Temp 98.7  F (37.1  C) (Oral)  Ht 5' 11\" (1.803 m)  Wt 162 lb 4.8 oz (73.6 kg)  LMP 08/05/2018  SpO2 97%  BMI 22.64 kg/m2 Estimated body mass index is 22.64 kg/(m^2) as calculated from the following:    Height as of this encounter: 5' 11\" (1.803 m).    Weight as of this encounter: 162 lb 4.8 oz (73.6 kg).        Health Maintenance due pending provider review:  Pap Smear    PAP--Possibly completing today, per Provider review    Linda He CMA  "

## 2018-08-30 NOTE — LETTER
My Migraine Action Plan      Date: 8/30/2018     My Name: Elmira Craft   YOB: 1990  My Pharmacy:    [facility] [place], [place] - [address] [place], [place] [address] [address] [facility] [address] [facility]       My (Preventative) Control Medicine:none        My Rescue Medicine: Excedrin    [facility]     My Clinic: [facility]  [address] [facility] [address] [phone]        GREEN ZONE = Good Control    My headache plan is working.   I can do what I need to do.           I WILL:     ? Keep managing my triggers.  ? Write in my migraine diary each time I have a headache.  ? Keep taking my preventive (controller) medicine daily.  ? Take my relief and rescue medicine as needed.             YELLOW ZONE = Not Enough Control    My headache plan isn t always working.   My headaches keep me from doing   some of the things I need to do.       I WILL:     ? Set goals to control my triggers and act on them.  ? Write in my migraine diary each time I have a headache and review it for                      patterns or new triggers.  ? Keep taking my preventive (controller) medicine daily.  ? Take my relief and rescue medicine as needed.  ? Call my doctor or clinic at if I stay in the Yellow Zone.             RED ZONE = Poor or No Control    My headache plan has  failed. I can t do anything  when I have one. My  medicines aren t working.           I WILL:   ? Set goals to control my triggers and act on them.  ? Write in my migraine diary each time I have a headache and review it for                      patterns or new triggers.  ? Keep taking my preventive (controller) medicine daily.  ? Take my relief and rescue medicine as needed.  ? Call my doctor or clinic or go to urgent care or an ER if I m having the worst                  headache of my life.  ? Call my doctor or clinic or go to urgent care or an ER if my  medicine doesn t work.  ? Let my doctor or clinic know within 2 weeks if I have gone to an urgent care or             emergency department.          Provider specific instructions:

## 2018-08-30 NOTE — PROGRESS NOTES
SUBJECTIVE:   CC: Elmira Craft is an 27 year old woman who presents for preventive health visit.     Physical   Annual:     Getting at least 3 servings of Calcium per day:  Yes    Bi-annual eye exam:  NO    Dental care twice a year:  Yes    Sleep apnea or symptoms of sleep apnea:  None    Diet:  Regular (no restrictions)    Frequency of exercise:  4-5 days/week    Duration of exercise:  45-60 minutes    Taking medications regularly:  Not Applicable    Additional concerns today:  No        PROBLEMS TO ADD ON...no new concerns  Migraine headache occasional now   Diagnosed first at age 9  Excedrin helps  Stress & dehydration are triggers      Today's PHQ-2 Score:   PHQ-2 ( 1999 [employer]) 8/27/2018   Q1: Little interest or pleasure in doing things 0   Q2: Feeling down, depressed or hopeless 0   PHQ-2 Score 0   Q1: Little interest or pleasure in doing things Not at all   Q2: Feeling down, depressed or hopeless Not at all   PHQ-2 Score 0       Abuse: Current or Past(Physical, Sexual or Emotional)- NO  Do you feel safe in your environment - YES    Social History   Substance Use Topics     Smoking status: Never Smoker     Smokeless tobacco: Never Used     Alcohol use 1.5 - 2.5 oz/week      Comment: occas     Alcohol Use 8/27/2018   If you drink alcohol do you typically have greater than 3 drinks per day OR greater than 7 drinks per week? No   No flowsheet data found.    Reviewed orders with patient.  Reviewed health maintenance and updated orders accordingly - Yes  Labs reviewed in EPIC    Mammogram not appropriate for this patient based on age.    Pertinent mammograms are reviewed under the imaging tab.  History of abnormal Pap smear: NO - age 21-29 PAP every 3 years recommended  PAP / HPV 1/20/2015 7/2/2012   PAP NIL NIL     Reviewed and updated as needed this visit by clinical staff  Tobacco  Allergies  Meds  Med Hx  Surg Hx  Fam Hx  Soc Hx        Reviewed and updated as needed this visit by Provider  Meds   "          Review of Systems  CONSTITUTIONAL: NEGATIVE for fever, chills, change in weight  INTEGUMENTARU/SKIN: NEGATIVE for worrisome rashes, moles or lesions  EYES: NEGATIVE for vision changes or irritation  ENT: NEGATIVE for ear, mouth and throat problems  RESP: NEGATIVE for significant cough or SOB  BREAST: NEGATIVE for masses, tenderness or discharge  CV: NEGATIVE for chest pain, palpitations or peripheral edema  GI: NEGATIVE for nausea, abdominal pain, heartburn, or change in bowel habits  : NEGATIVE for unusual urinary or vaginal symptoms. Periods are regular.  MUSCULOSKELETAL: NEGATIVE for significant arthralgias or myalgia  NEURO: NEGATIVE for weakness, dizziness or paresthesias  PSYCHIATRIC: NEGATIVE for changes in mood or affect     OBJECTIVE:   /81  Pulse 70  Temp 98.7  F (37.1  C) (Oral)  Ht 5' 11\" (1.803 m)  Wt 162 lb 4.8 oz (73.6 kg)  LMP 08/05/2018  SpO2 97%  BMI 22.64 kg/m2  Physical Exam  GENERAL: healthy, alert and no distress  EYES: Eyes grossly normal to inspection, PERRL and conjunctivae and sclerae normal  HENT: ear canals and TM's normal, nose and mouth without ulcers or lesions  NECK: no adenopathy, no asymmetry, masses, or scars and thyroid normal to palpation  RESP: lungs clear to auscultation - no rales, rhonchi or wheezes  BREAST: normal without masses, tenderness or nipple discharge and no palpable axillary masses or adenopathy. Accessory nipple  CV: regular rate and rhythm, normal S1 S2, no S3 or S4, no murmur, click or rub, no peripheral edema and peripheral pulses strong  ABDOMEN: soft, nontender, no hepatosplenomegaly, no masses and bowel sounds normal   (female): normal female external genitalia, normal urethral meatus, vaginal mucosa pink, moist, well rugated, and normal cervix/adnexa/uterus without masses or discharge. Pap is sent  MS: no gross musculoskeletal defects noted, no edema  SKIN: no suspicious lesions or rashes  NEURO: Normal strength and tone, " "mentation intact and speech normal  PSYCH: mentation appears normal, affect normal/bright    Diagnostic Test Results:  none     ASSESSMENT/PLAN:   1. Routine general medical examination at a health care facility  Please see patient instructions     2. Chronic migraine without aura without status migrainosus, not intractable  Plan: avoid triggers- no medications changes  Migraine headache occasional now   Diagnosed first at age 9  Excedrin helps  Stress & dehydration are triggers    3. Screening for cervical cancer  - Pap imaged thin layer screen reflex to HPV if ASCUS - recommend age 25 - 29      COUNSELING:  Reviewed preventive health counseling, as reflected in patient instructions       Regular exercise       Healthy diet/nutrition    BP Readings from Last 1 Encounters:   08/30/18 118/81     Estimated body mass index is 22.64 kg/(m^2) as calculated from the following:    Height as of this encounter: 5' 11\" (1.803 m).    Weight as of this encounter: 162 lb 4.8 oz (73.6 kg).           reports that she has never smoked. She has never used smokeless tobacco.      Counseling Resources:  ATP IV Guidelines  Pooled Cohorts Equation Calculator  Breast Cancer Risk Calculator  FRAX Risk Assessment  ICSI Preventive Guidelines  Dietary Guidelines for Americans, 2010  USDA's MyPlate  ASA Prophylaxis  Lung CA Screening    Judy Carmona MD  [facility]  "

## 2018-09-04 LAB
COPATH REPORT: NORMAL
PAP: NORMAL

## 2019-01-11 ENCOUNTER — HOSPITAL ENCOUNTER (EMERGENCY)
Facility: CLINIC | Age: 29
Discharge: HOME | End: 2019-01-11
Attending: EMERGENCY MEDICINE | Admitting: EMERGENCY MEDICINE
Payer: COMMERCIAL

## 2019-01-11 ENCOUNTER — APPOINTMENT (OUTPATIENT)
Dept: ULTRASOUND IMAGING | Facility: CLINIC | Age: 29
End: 2019-01-11
Payer: COMMERCIAL

## 2019-01-11 VITALS
OXYGEN SATURATION: 98 % | RESPIRATION RATE: 16 BRPM | WEIGHT: 173.6 LBS | SYSTOLIC BLOOD PRESSURE: 135 MMHG | TEMPERATURE: 97.4 F | HEART RATE: 45 BPM | BODY MASS INDEX: 24.3 KG/M2 | HEIGHT: 71 IN | DIASTOLIC BLOOD PRESSURE: 70 MMHG

## 2019-01-11 DIAGNOSIS — R10.30 LOWER ABDOMINAL PAIN: ICD-10-CM

## 2019-01-11 LAB
ALBUMIN SERPL-MCNC: 3.9 G/DL (ref 3.4–5)
ALBUMIN UR-MCNC: NEGATIVE MG/DL
ALP SERPL-CCNC: 43 U/L (ref 40–150)
ALT SERPL W P-5'-P-CCNC: 13 U/L (ref 0–50)
ANION GAP SERPL CALCULATED.3IONS-SCNC: 8 MMOL/L (ref 3–14)
APPEARANCE UR: CLEAR
AST SERPL W P-5'-P-CCNC: 17 U/L (ref 0–45)
BASOPHILS # BLD AUTO: 0 10E9/L (ref 0–0.2)
BASOPHILS NFR BLD AUTO: 0.2 %
BILIRUB SERPL-MCNC: 0.4 MG/DL (ref 0.2–1.3)
BILIRUB UR QL STRIP: NEGATIVE
BUN SERPL-MCNC: 11 MG/DL (ref 7–30)
CALCIUM SERPL-MCNC: 8.4 MG/DL (ref 8.5–10.1)
CHLORIDE SERPL-SCNC: 105 MMOL/L (ref 94–109)
CO2 SERPL-SCNC: 26 MMOL/L (ref 20–32)
COLOR UR AUTO: NORMAL
CREAT SERPL-MCNC: 0.59 MG/DL (ref 0.52–1.04)
DIFFERENTIAL METHOD BLD: NORMAL
EOSINOPHIL # BLD AUTO: 0.1 10E9/L (ref 0–0.7)
EOSINOPHIL NFR BLD AUTO: 2.3 %
ERYTHROCYTE [DISTWIDTH] IN BLOOD BY AUTOMATED COUNT: 13.2 % (ref 10–15)
GFR SERPL CREATININE-BSD FRML MDRD: >90 ML/MIN/{1.73_M2}
GLUCOSE SERPL-MCNC: 91 MG/DL (ref 70–99)
GLUCOSE UR STRIP-MCNC: NEGATIVE MG/DL
HCG UR QL: NEGATIVE
HCT VFR BLD AUTO: 37.5 % (ref 35–47)
HGB BLD-MCNC: 12.6 G/DL (ref 11.7–15.7)
HGB UR QL STRIP: NEGATIVE
IMM GRANULOCYTES # BLD: 0 10E9/L (ref 0–0.4)
IMM GRANULOCYTES NFR BLD: 0.2 %
KETONES UR STRIP-MCNC: NEGATIVE MG/DL
LEUKOCYTE ESTERASE UR QL STRIP: NEGATIVE
LYMPHOCYTES # BLD AUTO: 1.8 10E9/L (ref 0.8–5.3)
LYMPHOCYTES NFR BLD AUTO: 31.5 %
MCH RBC QN AUTO: 30.7 PG (ref 26.5–33)
MCHC RBC AUTO-ENTMCNC: 33.6 G/DL (ref 31.5–36.5)
MCV RBC AUTO: 92 FL (ref 78–100)
MONOCYTES # BLD AUTO: 0.4 10E9/L (ref 0–1.3)
MONOCYTES NFR BLD AUTO: 7.2 %
NEUTROPHILS # BLD AUTO: 3.3 10E9/L (ref 1.6–8.3)
NEUTROPHILS NFR BLD AUTO: 58.6 %
NITRATE UR QL: NEGATIVE
NRBC # BLD AUTO: 0 10*3/UL
NRBC BLD AUTO-RTO: 0 /100
PH UR STRIP: 6 PH (ref 5–7)
PLATELET # BLD AUTO: 282 10E9/L (ref 150–450)
POTASSIUM SERPL-SCNC: 3.6 MMOL/L (ref 3.4–5.3)
PROT SERPL-MCNC: 7.8 G/DL (ref 6.8–8.8)
RBC # BLD AUTO: 4.1 10E12/L (ref 3.8–5.2)
SODIUM SERPL-SCNC: 139 MMOL/L (ref 133–144)
SOURCE: NORMAL
SP GR UR STRIP: 1.01 (ref 1–1.03)
UROBILINOGEN UR STRIP-MCNC: NORMAL MG/DL (ref 0–2)
WBC # BLD AUTO: 5.6 10E9/L (ref 4–11)

## 2019-01-11 PROCEDURE — 81003 URINALYSIS AUTO W/O SCOPE: CPT | Performed by: EMERGENCY MEDICINE

## 2019-01-11 PROCEDURE — 99284 EMERGENCY DEPT VISIT MOD MDM: CPT | Mod: Z6 | Performed by: EMERGENCY MEDICINE

## 2019-01-11 PROCEDURE — 80053 COMPREHEN METABOLIC PANEL: CPT | Performed by: EMERGENCY MEDICINE

## 2019-01-11 PROCEDURE — 93976 VASCULAR STUDY: CPT

## 2019-01-11 PROCEDURE — 99284 EMERGENCY DEPT VISIT MOD MDM: CPT | Mod: 25 | Performed by: EMERGENCY MEDICINE

## 2019-01-11 PROCEDURE — 81025 URINE PREGNANCY TEST: CPT | Performed by: EMERGENCY MEDICINE

## 2019-01-11 PROCEDURE — 85025 COMPLETE CBC W/AUTO DIFF WBC: CPT | Performed by: EMERGENCY MEDICINE

## 2019-01-11 ASSESSMENT — ENCOUNTER SYMPTOMS
NAUSEA: 1
BLOOD IN STOOL: 1
VOMITING: 0
FREQUENCY: 0
DYSURIA: 0
ABDOMINAL PAIN: 1
DIARRHEA: 1
BACK PAIN: 1

## 2019-01-11 ASSESSMENT — MIFFLIN-ST. JEOR: SCORE: 1613.57

## 2019-01-11 NOTE — ED AVS SNAPSHOT
[facility], [place], Emergency Department  [address]  [place] [address]  Phone:  [phone]  Fax:  [phone]                                    Elmira Craft   MRN: [id number]    Department:  [facility], Emergency Department   Date of Visit:  1/11/2019           After Visit Summary Signature Page    I have received my discharge instructions, and my questions have been answered. I have discussed any challenges I see with this plan with the nurse or doctor.    ..........................................................................................................................................  Patient/Patient Representative Signature      ..........................................................................................................................................  Patient Representative Print Name and Relationship to Patient    ..................................................               ................................................  Date                                   Time    ..........................................................................................................................................  Reviewed by Signature/Title    ...................................................              ..............................................  Date                                               Time          22EPIC Rev 08/18

## 2019-01-11 NOTE — ED PROVIDER NOTES
History     Chief Complaint   Patient presents with     Abdominal Pain     currently having her period & is having severe uterine pain, last time she had this symptom she was dx to to have cyst on her ovary and the cyst was taken out. Today the intensity of pain is back and is more R sided, there is mariana back pain, + nausea no vomiting, noted small amount of blood in stool.     HPI  Elmira Craft is a 28 year old female with a history of left ovarian cyst status post cystectomy, and migraines, who presents to the Emergency Department for evaluation of abdominal pain. Patient complains of acute onset of lower abdominal pain approximately four hours ago. Patient reports that the pain is more right-sided and was significantly worse prior to her arrival here. She currently is not experiencing much pain. She endorses associated lower back pain, nausea, and some diarrhea in which she noticed it was accompanied by a small amount of blood. She denies any emesis, or any urinary symptoms including dysuria, frequency or urgency. Patient reports that her symptoms are similar in nature to previous that was attributed to a left ovarian cysts which was removed about three years ago. Patient reports that she was doing relatively well following the procedure up until about March of 2018, when her symptoms returned. She was reportedly evaluated for this, however, no etiology was determined. Patient is currently menstruating and states that it began three days ago.     I have reviewed the Medications, Allergies, Past Medical and Surgical History, and Social History in the Affect[employer] system.    Past Medical History:   Diagnosis Date     Chickenpox      Dysmenorrhea      Migraines      Ovarian cyst 2012       Past Surgical History:   Procedure Laterality Date     ARTHROSCOPY KNEE WITH PATELLAR REALIGNMENT Left 11/24/2015    Procedure: ARTHROSCOPY KNEE WITH PATELLAR REALIGNMENT;  Surgeon: Seth De La Rosa MD;  [facility]  "    HC TOOTH EXTRACTION W/FORCEP       LAPAROSCOPY DIAGNOSTIC (GYN) Left 3/25/2016    Procedure: LAPAROSCOPY DIAGNOSTIC (GYN);  Surgeon: Danya Cain MD;  Location: [facility]       Family History   Problem Relation Age of Onset     Depression Mother      Thyroid Disease Mother      Neurologic Disorder Mother      Cancer Mother         skin     Cancer Paternal Grandfather         lymphoma     Breast Cancer Maternal Grandmother 60       Social History     Tobacco Use     Smoking status: Never Smoker     Smokeless tobacco: Never Used   Substance Use Topics     Alcohol use: Yes     Alcohol/week: 1.5 - 2.5 oz     Comment: occas     No current facility-administered medications for this encounter.      Current Outpatient Medications   Medication     Aspirin-Acetaminophen-Caffeine (EXCEDRIN MIGRAINE PO)     Facility-Administered Medications Ordered in Other Encounters   Medication     bupivacaine 0.25 % - EPINEPHrine 1:200,000 injection     dexamethasone (DECADRON) injection        Allergies   Allergen Reactions     Sulfa Drugs Unknown     DURING CHILDHOOD       Review of Systems   Constitutional: Negative for fever.   HENT: Negative for congestion.    Eyes: Negative for redness.   Respiratory: Negative for shortness of breath.    Cardiovascular: Negative for chest pain.   Gastrointestinal: Positive for abdominal pain (RLQ), blood in stool, diarrhea and nausea. Negative for vomiting.   Genitourinary: Negative for difficulty urinating, dysuria, frequency and urgency.   Musculoskeletal: Positive for back pain (lumbar). Negative for arthralgias and neck stiffness.   Skin: Negative for color change.   Neurological: Negative for headaches.   Psychiatric/Behavioral: Negative for confusion.   All other systems reviewed and are negative.      Physical Exam   BP: (!) 141/91  Pulse: (!) 45  Temp: 97.4  F (36.3  C)  Resp: 16  Height: 180.3 cm (5' 11\")  Weight: 78.7 kg (173 lb 9.6 oz)  SpO2: 98 %      Physical Exam "   Constitutional: She is oriented to person, place, and time. She appears well-developed and well-nourished. No distress.   HENT:   Head: Normocephalic and atraumatic.   Mouth/Throat: No oropharyngeal exudate.   Eyes: Pupils are equal, round, and reactive to light. Right eye exhibits no discharge. Left eye exhibits no discharge. No scleral icterus.   Neck: Normal range of motion. Neck supple.   Cardiovascular: Normal rate, regular rhythm, normal heart sounds and intact distal pulses. Exam reveals no gallop and no friction rub.   No murmur heard.  Pulmonary/Chest: Effort normal and breath sounds normal. No respiratory distress. She has no wheezes. She exhibits no tenderness.   Abdominal: Soft. Bowel sounds are normal. She exhibits no distension. There is no tenderness.   Musculoskeletal: Normal range of motion. She exhibits no edema, tenderness or deformity.   Neurological: She is alert and oriented to person, place, and time. No cranial nerve deficit.   Skin: Skin is warm and dry. No rash noted. She is not diaphoretic. No erythema. No pallor.   Psychiatric: She has a normal mood and affect.   Nursing note and vitals reviewed.      ED Course     Results for orders placed or performed during the hospital encounter of 01/11/19   US Pelvis Cmplt w Transvag & Doppler LmtPel Duplex Limited    Narrative    US PELVIS COMPLETE WITH TRANSVAGINAL AND DOPPLER LIMITED    1/11/2019  6:31 PM     HISTORY: Pelvic pain.    TECHNIQUE: Transvaginal images were performed to better evaluate the  patient's uterus, ovaries and endometrial stripe. Spectral Doppler and  waveform analysis was also performed.    COMPARISON: CT of the abdomen and pelvis performed 3/6/2018.    FINDINGS: The uterus is measured at 6.7 x 4.2 x 3.3 cm. No fibroids  are evident. Endometrial stripe measures 0.1 cm and is within normal  limits. The right ovary is unremarkable. The left ovary is  unremarkable. No solid adnexal masses are identified. No free  pelvic  fluid is present. Spectral Doppler and waveform analysis demonstrates  arterial and venous blood flow within the ovaries bilaterally.      Impression    IMPRESSION: Unremarkable pelvic ultrasound. No cause for pelvic pain  is identified. No convincing sonographic evidence for ovarian torsion.    MARCOS MACE MD   CBC with platelets differential   Result Value Ref Range    WBC 5.6 4.0 - 11.0 10e9/L    RBC Count 4.10 3.8 - 5.2 10e12/L    Hemoglobin 12.6 11.7 - 15.7 g/dL    Hematocrit 37.5 35.0 - 47.0 %    MCV 92 78 - 100 fl    MCH 30.7 26.5 - 33.0 pg    MCHC 33.6 31.5 - 36.5 g/dL    RDW 13.2 10.0 - 15.0 %    Platelet Count 282 150 - 450 10e9/L    Diff Method Automated Method     % Neutrophils 58.6 %    % Lymphocytes 31.5 %    % Monocytes 7.2 %    % Eosinophils 2.3 %    % Basophils 0.2 %    % Immature Granulocytes 0.2 %    Nucleated RBCs 0 0 /100    Absolute Neutrophil 3.3 1.6 - 8.3 10e9/L    Absolute Lymphocytes 1.8 0.8 - 5.3 10e9/L    Absolute Monocytes 0.4 0.0 - 1.3 10e9/L    Absolute Eosinophils 0.1 0.0 - 0.7 10e9/L    Absolute Basophils 0.0 0.0 - 0.2 10e9/L    Abs Immature Granulocytes 0.0 0 - 0.4 10e9/L    Absolute Nucleated RBC 0.0    Comprehensive metabolic panel   Result Value Ref Range    Sodium 139 133 - 144 mmol/L    Potassium 3.6 3.4 - 5.3 mmol/L    Chloride 105 94 - 109 mmol/L    Carbon Dioxide 26 20 - 32 mmol/L    Anion Gap 8 3 - 14 mmol/L    Glucose 91 70 - 99 mg/dL    Urea Nitrogen 11 7 - 30 mg/dL    Creatinine 0.59 0.52 - 1.04 mg/dL    GFR Estimate >90 >60 mL/min/[1.73_m2]    GFR Estimate If Black >90 >60 mL/min/[1.73_m2]    Calcium 8.4 (L) 8.5 - 10.1 mg/dL    Bilirubin Total 0.4 0.2 - 1.3 mg/dL    Albumin 3.9 3.4 - 5.0 g/dL    Protein Total 7.8 6.8 - 8.8 g/dL    Alkaline Phosphatase 43 40 - 150 U/L    ALT 13 0 - 50 U/L    AST 17 0 - 45 U/L   UA reflex to Microscopic and Culture   Result Value Ref Range    Color Urine Light Yellow     Appearance Urine Clear     Glucose Urine Negative  NEG^Negative mg/dL    Bilirubin Urine Negative NEG^Negative    Ketones Urine Negative NEG^Negative mg/dL    Specific Gravity Urine 1.015 1.003 - 1.035    Blood Urine Negative NEG^Negative    pH Urine 6.0 5.0 - 7.0 pH    Protein Albumin Urine Negative NEG^Negative mg/dL    Urobilinogen mg/dL Normal 0.0 - 2.0 mg/dL    Nitrite Urine Negative NEG^Negative    Leukocyte Esterase Urine Negative NEG^Negative    Source Unspecified Urine    HCG qualitative urine (UPT)   Result Value Ref Range    HCG Qual Urine Negative NEG^Negative        Procedures             Critical Care time:  none             Labs Ordered and Resulted from Time of ED Arrival Up to the Time of Departure from the ED - No data to display         Assessments & Plan (with Medical Decision Making)   This is a 28-year-old female with lower abdominal pain.  This began while she is at work today.  Patient states it is worse on the right side.  Patient has a history of ovarian cyst and states this feels somewhat similar.  Patient's pain is improved and she is mostly pain-free in the emergency department.  Abdomen was soft and nontender on exam.  CBC, CMP and UA showed no acute abnormalities.  Patient is not pregnant.  We obtained a pelvic ultrasound which shows no ovarian cyst, no evidence of torsion.  I discussed all results with patient.  As the symptoms began just before arrival to the emergency department and patient was nontender there is decided to not do a CT scan as appendicitis would not likely appear on a CT scan at this early.  I emphasized the importance of further follow-up for these episodes and will help patient establish PCP.  I discussed the possibility of early appendicitis and reasons to return to the emergency department.  Will discharge home with return precautions. Discussed reasons to return to the emergency department.  Patient understands and agrees with this plan.    I have reviewed the nursing notes.    I have reviewed the findings,  diagnosis, plan and need for follow up with the patient.       Medication List      There are no discharge medications for this visit.         Final diagnoses:   None     IIrina, am serving as a trained medical scribe to document services personally performed by Michael Durant DO, based on the provider's statements to me.   Michael ZAVALETA DO, was physically present and have reviewed and verified the accuracy of this note documented by Irina Meraz.    1/11/2019   [facility], [place], EMERGENCY DEPARTMENT     Michael Durant DO  01/12/19 0132

## 2019-01-12 ASSESSMENT — ENCOUNTER SYMPTOMS
COLOR CHANGE: 0
EYE REDNESS: 0
CONFUSION: 0
ARTHRALGIAS: 0
FEVER: 0
HEADACHES: 0
DIFFICULTY URINATING: 0
NECK STIFFNESS: 0
SHORTNESS OF BREATH: 0

## 2019-01-12 NOTE — DISCHARGE INSTRUCTIONS
Please make an appointment to follow up with [facility] (phone: [phone]) as soon as possible.   Return to the emergency department if symptoms continue, get worse, there are any new symptoms or any cause for concern.

## 2019-02-13 ENCOUNTER — HOSPITAL ENCOUNTER (EMERGENCY)
Facility: CLINIC | Age: 29
Discharge: HOME | End: 2019-02-14
Attending: EMERGENCY MEDICINE | Admitting: EMERGENCY MEDICINE
Payer: COMMERCIAL

## 2019-02-13 DIAGNOSIS — G43.809 OTHER MIGRAINE WITHOUT STATUS MIGRAINOSUS, NOT INTRACTABLE: ICD-10-CM

## 2019-02-13 PROCEDURE — 25000128 H RX IP 250 OP 636: Performed by: EMERGENCY MEDICINE

## 2019-02-13 PROCEDURE — 99284 EMERGENCY DEPT VISIT MOD MDM: CPT | Mod: 25 | Performed by: EMERGENCY MEDICINE

## 2019-02-13 PROCEDURE — 85025 COMPLETE CBC W/AUTO DIFF WBC: CPT | Performed by: EMERGENCY MEDICINE

## 2019-02-13 PROCEDURE — 84703 CHORIONIC GONADOTROPIN ASSAY: CPT | Performed by: EMERGENCY MEDICINE

## 2019-02-13 PROCEDURE — 96361 HYDRATE IV INFUSION ADD-ON: CPT | Performed by: EMERGENCY MEDICINE

## 2019-02-13 PROCEDURE — 99284 EMERGENCY DEPT VISIT MOD MDM: CPT | Mod: Z6 | Performed by: EMERGENCY MEDICINE

## 2019-02-13 PROCEDURE — 96375 TX/PRO/DX INJ NEW DRUG ADDON: CPT | Performed by: EMERGENCY MEDICINE

## 2019-02-13 PROCEDURE — 96374 THER/PROPH/DIAG INJ IV PUSH: CPT | Performed by: EMERGENCY MEDICINE

## 2019-02-13 PROCEDURE — 80053 COMPREHEN METABOLIC PANEL: CPT | Performed by: EMERGENCY MEDICINE

## 2019-02-13 PROCEDURE — 25800030 ZZH RX IP 258 OP 636

## 2019-02-13 RX ORDER — KETOROLAC TROMETHAMINE 30 MG/ML
30 INJECTION, SOLUTION INTRAMUSCULAR; INTRAVENOUS ONCE
Status: COMPLETED | OUTPATIENT
Start: 2019-02-13 | End: 2019-02-13

## 2019-02-13 RX ORDER — SODIUM CHLORIDE 9 MG/ML
1000 INJECTION, SOLUTION INTRAVENOUS CONTINUOUS
Status: DISCONTINUED | OUTPATIENT
Start: 2019-02-13 | End: 2019-02-14 | Stop reason: HOSPADM

## 2019-02-13 RX ORDER — SODIUM CHLORIDE 9 MG/ML
INJECTION, SOLUTION INTRAVENOUS
Status: COMPLETED
Start: 2019-02-13 | End: 2019-02-14

## 2019-02-13 RX ADMIN — Medication 1000 ML: at 23:41

## 2019-02-13 RX ADMIN — PROCHLORPERAZINE EDISYLATE 10 MG: 5 INJECTION INTRAMUSCULAR; INTRAVENOUS at 23:43

## 2019-02-13 RX ADMIN — SODIUM CHLORIDE 1000 ML: 9 INJECTION, SOLUTION INTRAVENOUS at 23:41

## 2019-02-13 RX ADMIN — KETOROLAC TROMETHAMINE 30 MG: 30 INJECTION, SOLUTION INTRAMUSCULAR; INTRAVENOUS at 23:41

## 2019-02-13 ASSESSMENT — ENCOUNTER SYMPTOMS
PHOTOPHOBIA: 1
DIARRHEA: 0
VOMITING: 1
HEADACHES: 1
ABDOMINAL PAIN: 1
CHILLS: 1
FEVER: 0

## 2019-02-13 NOTE — ED AVS SNAPSHOT
[facility], [place], Emergency Department  [address]  [place] [address]  Phone:  [phone]  Fax:  [phone]                                    Elmira Craft   MRN: [id number]    Department:  [facility], Emergency Department   Date of Visit:  2/13/2019           After Visit Summary Signature Page    I have received my discharge instructions, and my questions have been answered. I have discussed any challenges I see with this plan with the nurse or doctor.    ..........................................................................................................................................  Patient/Patient Representative Signature      ..........................................................................................................................................  Patient Representative Print Name and Relationship to Patient    ..................................................               ................................................  Date                                   Time    ..........................................................................................................................................  Reviewed by Signature/Title    ...................................................              ..............................................  Date                                               Time          22EPIC Rev 08/18

## 2019-02-14 VITALS
BODY MASS INDEX: 23.43 KG/M2 | RESPIRATION RATE: 16 BRPM | WEIGHT: 168 LBS | SYSTOLIC BLOOD PRESSURE: 113 MMHG | TEMPERATURE: 98.4 F | HEART RATE: 50 BPM | OXYGEN SATURATION: 98 % | DIASTOLIC BLOOD PRESSURE: 85 MMHG

## 2019-02-14 LAB
ALBUMIN SERPL-MCNC: 3.9 G/DL (ref 3.4–5)
ALP SERPL-CCNC: 54 U/L (ref 40–150)
ALT SERPL W P-5'-P-CCNC: 24 U/L (ref 0–50)
ANION GAP SERPL CALCULATED.3IONS-SCNC: 11 MMOL/L (ref 3–14)
AST SERPL W P-5'-P-CCNC: 47 U/L (ref 0–45)
BASOPHILS # BLD AUTO: 0 10E9/L (ref 0–0.2)
BASOPHILS NFR BLD AUTO: 0.2 %
BILIRUB SERPL-MCNC: 0.7 MG/DL (ref 0.2–1.3)
BUN SERPL-MCNC: 9 MG/DL (ref 7–30)
CALCIUM SERPL-MCNC: 8.7 MG/DL (ref 8.5–10.1)
CHLORIDE SERPL-SCNC: 104 MMOL/L (ref 94–109)
CO2 SERPL-SCNC: 24 MMOL/L (ref 20–32)
CREAT SERPL-MCNC: 0.56 MG/DL (ref 0.52–1.04)
DIFFERENTIAL METHOD BLD: NORMAL
EOSINOPHIL # BLD AUTO: 0 10E9/L (ref 0–0.7)
EOSINOPHIL NFR BLD AUTO: 0.7 %
ERYTHROCYTE [DISTWIDTH] IN BLOOD BY AUTOMATED COUNT: 12.7 % (ref 10–15)
GFR SERPL CREATININE-BSD FRML MDRD: >90 ML/MIN/{1.73_M2}
GLUCOSE SERPL-MCNC: 86 MG/DL (ref 70–99)
HCG SERPL QL: NEGATIVE
HCT VFR BLD AUTO: 39.6 % (ref 35–47)
HGB BLD-MCNC: 13.2 G/DL (ref 11.7–15.7)
IMM GRANULOCYTES # BLD: 0 10E9/L (ref 0–0.4)
IMM GRANULOCYTES NFR BLD: 0.2 %
LYMPHOCYTES # BLD AUTO: 1.5 10E9/L (ref 0.8–5.3)
LYMPHOCYTES NFR BLD AUTO: 26.3 %
MCH RBC QN AUTO: 30.3 PG (ref 26.5–33)
MCHC RBC AUTO-ENTMCNC: 33.3 G/DL (ref 31.5–36.5)
MCV RBC AUTO: 91 FL (ref 78–100)
MONOCYTES # BLD AUTO: 0.4 10E9/L (ref 0–1.3)
MONOCYTES NFR BLD AUTO: 7.1 %
NEUTROPHILS # BLD AUTO: 3.8 10E9/L (ref 1.6–8.3)
NEUTROPHILS NFR BLD AUTO: 65.5 %
NRBC # BLD AUTO: 0 10*3/UL
NRBC BLD AUTO-RTO: 0 /100
PLATELET # BLD AUTO: 290 10E9/L (ref 150–450)
POTASSIUM SERPL-SCNC: 3.6 MMOL/L (ref 3.4–5.3)
PROT SERPL-MCNC: 8 G/DL (ref 6.8–8.8)
RBC # BLD AUTO: 4.36 10E12/L (ref 3.8–5.2)
SODIUM SERPL-SCNC: 139 MMOL/L (ref 133–144)
WBC # BLD AUTO: 5.7 10E9/L (ref 4–11)

## 2019-02-14 RX ORDER — ONDANSETRON 4 MG/1
4 TABLET, ORALLY DISINTEGRATING ORAL EVERY 8 HOURS PRN
Qty: 10 TABLET | Refills: 0 | Status: SHIPPED | OUTPATIENT
Start: 2019-02-14 | End: 2019-02-17

## 2019-02-14 NOTE — ED PROVIDER NOTES
"  History     Chief Complaint   Patient presents with     Nausea & Vomiting     Has been nauseated for 12 hours. Emesis x 5. \"I can't keep liquids down.\" Migraine.     HPI  Elmira Craft is a 28 year old female with a history of left ovarian cyst s/p cystectomy and frequent migraines who presents for evaluation of nausea and vomiting. Patient states she woke with a frontal migraine today; she reports pain across her forehead and temples, though states it feels different than prior migraines. She states she had attempted to go to work, but vomited once around 10 PM and had to leave early. Patient left her shift, tried to sleep, but proceeded to vomit 3 more times. She states she's been unable to keep water or electrolytes down. In addition to these symptoms, patient complains of photophobia, mild abdominal pain, chills, and bilateral calf pain (since Monday, 2 days ago, after attending a dance class). Patient notes people at work have been getting sick recently. She denies diarrhea, fevers, or change in vision/gait/balance.    Past Medical History:   Diagnosis Date     Chickenpox      Dysmenorrhea      Migraines      Ovarian cyst 2012       Past Surgical History:   Procedure Laterality Date     ARTHROSCOPY KNEE WITH PATELLAR REALIGNMENT Left 11/24/2015    Procedure: ARTHROSCOPY KNEE WITH PATELLAR REALIGNMENT;  Surgeon: Seth De La Rosa MD;  Location:  OR      TOOTH EXTRACTION W/FORCEP       LAPAROSCOPY DIAGNOSTIC (GYN) Left 3/25/2016    Procedure: LAPAROSCOPY DIAGNOSTIC (GYN);  Surgeon: Danya Cain MD;  Location: [facility]       Family History   Problem Relation Age of Onset     Depression Mother      Thyroid Disease Mother      Neurologic Disorder Mother      Cancer Mother         skin     Cancer Paternal Grandfather         lymphoma     Breast Cancer Maternal Grandmother 60       Social History     Tobacco Use     Smoking status: Never Smoker     Smokeless tobacco: Never Used "   Substance Use Topics     Alcohol use: Yes     Alcohol/week: 1.5 - 2.5 oz     Comment: occas       No current facility-administered medications for this encounter.      Current Outpatient Medications   Medication     Aspirin-Acetaminophen-Caffeine (EXCEDRIN MIGRAINE PO)     Facility-Administered Medications Ordered in Other Encounters   Medication     bupivacaine 0.25 % - EPINEPHrine 1:200,000 injection     dexamethasone (DECADRON) injection        Allergies   Allergen Reactions     Sulfa Drugs Unknown     DURING CHILDHOOD     I have reviewed the Medications, Allergies, Past Medical and Surgical History, and Social History in the Epic system.    Review of Systems   Constitutional: Positive for chills. Negative for fever.   Eyes: Positive for photophobia. Negative for visual disturbance.   Gastrointestinal: Positive for abdominal pain and vomiting. Negative for diarrhea.   Musculoskeletal:        Positive for bilateral calf pain   Neurological: Positive for headaches.   All other systems reviewed and are negative.      Physical Exam   BP: 133/77  Pulse: 64  Temp: 97.2  F (36.2  C)  Resp: 16  Weight: 76.2 kg (168 lb)      Physical Exam  Physical Exam   Constitutional: oriented to person, place, and time. appears well-developed and well-nourished.   HENT:   Head: Normocephalic and atraumatic.   Neck: Normal range of motion. No nuchal rigidity.  Pulmonary/Chest: Effort normal. No respiratory distress.   Cardiac: No murmurs, rubs, gallops. RRR.  Abdominal: Abdomen soft, nontender, nondistended. No rebound tenderness.  MSK: Long bones without deformity or evidence of trauma  Neurological: alert and oriented to person, place, and time. Gait intact.  Strength 5 out of 5 in upper and lower extremity's.  Cranial nerves II 12 intact.  Pupils are 3 mm and reactive to light bilaterally.  No nystagmus.  Sensation grossly intact in all extremities.  Negative Kernig's and Brudzinski's  Skin: Skin is warm and dry.   Psychiatric:   normal mood and affect.  behavior is normal. Thought content normal.     ED Course        Procedures       11:08 PM  The patient was seen and examined by Dr. Luo in Room 5.     Results for orders placed or performed during the hospital encounter of 02/13/19   CBC with platelets differential   Result Value Ref Range    WBC 5.7 4.0 - 11.0 10e9/L    RBC Count 4.36 3.8 - 5.2 10e12/L    Hemoglobin 13.2 11.7 - 15.7 g/dL    Hematocrit 39.6 35.0 - 47.0 %    MCV 91 78 - 100 fl    MCH 30.3 26.5 - 33.0 pg    MCHC 33.3 31.5 - 36.5 g/dL    RDW 12.7 10.0 - 15.0 %    Platelet Count 290 150 - 450 10e9/L    Diff Method Automated Method     % Neutrophils 65.5 %    % Lymphocytes 26.3 %    % Monocytes 7.1 %    % Eosinophils 0.7 %    % Basophils 0.2 %    % Immature Granulocytes 0.2 %    Nucleated RBCs 0 0 /100    Absolute Neutrophil 3.8 1.6 - 8.3 10e9/L    Absolute Lymphocytes 1.5 0.8 - 5.3 10e9/L    Absolute Monocytes 0.4 0.0 - 1.3 10e9/L    Absolute Eosinophils 0.0 0.0 - 0.7 10e9/L    Absolute Basophils 0.0 0.0 - 0.2 10e9/L    Abs Immature Granulocytes 0.0 0 - 0.4 10e9/L    Absolute Nucleated RBC 0.0    Comprehensive metabolic panel   Result Value Ref Range    Sodium 139 133 - 144 mmol/L    Potassium 3.6 3.4 - 5.3 mmol/L    Chloride 104 94 - 109 mmol/L    Carbon Dioxide 24 20 - 32 mmol/L    Anion Gap 11 3 - 14 mmol/L    Glucose 86 70 - 99 mg/dL    Urea Nitrogen 9 7 - 30 mg/dL    Creatinine 0.56 0.52 - 1.04 mg/dL    GFR Estimate >90 >60 mL/min/[1.73_m2]    GFR Estimate If Black >90 >60 mL/min/[1.73_m2]    Calcium 8.7 8.5 - 10.1 mg/dL    Bilirubin Total PENDING 0.2 - 1.3 mg/dL    Albumin PENDING 3.4 - 5.0 g/dL    Protein Total PENDING 6.8 - 8.8 g/dL    Alkaline Phosphatase PENDING 40 - 150 U/L    ALT PENDING 0 - 50 U/L    AST PENDING 0 - 45 U/L   HCG qualitative pregnancy (blood)   Result Value Ref Range    HCG Qualitative Serum Negative NEG^Negative       Labs Ordered and Resulted from Time of ED Arrival Up to the Time of Departure  from the ED - No data to display         Assessments & Plan (with Medical Decision Making)   MDM  Patient presenting with headache and nausea.  She has not had an episode of emesis here in the emergency department and is tolerating p.o. intake.  Labs here are unremarkable.  Normal metabolic panel, negative white count.  No signs of meningitis or encephalitis.  No trauma to suggest intracranial hemorrhage.  Migraine is typical for her.  Migraine is now gone after Toradol, fluids and Compazine.  Her nausea and vomiting are gone after repeat evaluation.  Not suspect catastrophic intracranial event so we will defer imaging at this point.  Patient be given Zofran for home.  She will return if symptoms are worsening.    I have reviewed the nursing notes.    I have reviewed the findings, diagnosis, plan and need for follow up with the patient.       Medication List      There are no discharge medications for this visit.         Final diagnoses:   Other migraine without status migrainosus, not intractable   IJulian, am serving as a trained medical scribe to document services personally performed by Marcin Luo MD, based on the provider's statements to me.   Marcin ZAVALETA MD, was physically present and have reviewed and verified the accuracy of this note documented by Julian Feliz.      2/13/2019   [facility], EMERGENCY DEPARTMENT     Marcin Luo MD  02/14/19 0024

## 2019-02-14 NOTE — DISCHARGE INSTRUCTIONS
Please make an appointment to [facility] (phone: [phone] in 3-5 days if you have any concerns.    If Elmira has discomfort from fever or other pain, she can have:  Acetaminophen (Tylenol) every 6 hours as needed. Her dose is:    2 regular strength tabs (650 mg)                                     (43.2+ kg/96+ lb)    NOTE: If your acetaminophen (Tylenol) came with a dropper marked with 0.4 and 0.8 ml, call us ([phone]) or check with your doctor about the dose before using it.     AND/OR      Ibuprofen (Advil, Motrin) every 6 hours as needed. His/her dose is:    1 tab of the 600 mg prescription tabs                                                                  (60-80 kg/132-176 lb)

## 2019-04-17 ENCOUNTER — OFFICE VISIT (OUTPATIENT)
Dept: FAMILY MEDICINE | Facility: CLINIC | Age: 29
End: 2019-04-17
Payer: COMMERCIAL

## 2019-04-17 VITALS
OXYGEN SATURATION: 95 % | HEART RATE: 71 BPM | TEMPERATURE: 97.4 F | SYSTOLIC BLOOD PRESSURE: 124 MMHG | WEIGHT: 173 LBS | RESPIRATION RATE: 16 BRPM | BODY MASS INDEX: 24.13 KG/M2 | DIASTOLIC BLOOD PRESSURE: 85 MMHG

## 2019-04-17 DIAGNOSIS — R10.84 ABDOMINAL PAIN, GENERALIZED: Primary | ICD-10-CM

## 2019-04-17 PROCEDURE — 99214 OFFICE O/P EST MOD 30 MIN: CPT | Performed by: FAMILY MEDICINE

## 2019-04-17 ASSESSMENT — ANXIETY QUESTIONNAIRES
5. BEING SO RESTLESS THAT IT IS HARD TO SIT STILL: NOT AT ALL
GAD7 TOTAL SCORE: 0
1. FEELING NERVOUS, ANXIOUS, OR ON EDGE: NOT AT ALL
3. WORRYING TOO MUCH ABOUT DIFFERENT THINGS: NOT AT ALL
2. NOT BEING ABLE TO STOP OR CONTROL WORRYING: NOT AT ALL
6. BECOMING EASILY ANNOYED OR IRRITABLE: NOT AT ALL
IF YOU CHECKED OFF ANY PROBLEMS ON THIS QUESTIONNAIRE, HOW DIFFICULT HAVE THESE PROBLEMS MADE IT FOR YOU TO DO YOUR WORK, TAKE CARE OF THINGS AT HOME, OR GET ALONG WITH OTHER PEOPLE: NOT DIFFICULT AT ALL
7. FEELING AFRAID AS IF SOMETHING AWFUL MIGHT HAPPEN: NOT AT ALL

## 2019-04-17 ASSESSMENT — PATIENT HEALTH QUESTIONNAIRE - PHQ9
SUM OF ALL RESPONSES TO PHQ QUESTIONS 1-9: 0
5. POOR APPETITE OR OVEREATING: NOT AT ALL

## 2019-04-17 NOTE — PATIENT INSTRUCTIONS
"  Patient Education     What is Irritable Bowel Syndrome (IBS)?     Muscle contraction moves food through the digestive tract.      People who have irritable bowel syndrome (IBS) have digestive tracts that react abnormally to certain substances or to stress. This leads to symptoms like cramps, gas, bloating, pain, constipation, and diarrhea. Sometimes called  spastic colon,  IBS is a common condition that is not a disease, but rather a group of symptoms that happen together.  IBS a motility problem  The muscle movement that passes food through the digestive tract is called motility. When you have IBS, the normal motility of the digestive tract (especially the colon) is disrupted. Motility may speed up, slow down, or become irregular. If stool passes too quickly through the colon, not enough water is absorbed from it. Loose, watery stools (diarrhea) can result. If stool passes through the colon too slowly, too much water is absorbed and the stool becomes hard and dry (constipation). Also, stool and gas may back up and cause painful pressure and cramping. There is no single test that can diagnose IBS. It is a group of symptoms that help your healthcare provider with the diagnosis. Often multiple blood, stool, radiologic tests, or even colonoscopy are performed in the evaluation of people suspected to have IBS. These are done primarily to make sure that there are no other illnesses that can account for your symptoms.   What causes IBS?  A great deal of research has been done on IBS, but the cause is still not known. Some of the possible factors include:     Smoking, eating certain foods, or drinking alcohol or caffeinated drinks can cause, or \"trigger,\" symptoms of IBS.    Although no one knows for sure, IBS may be caused by a problem with the nerves or muscles in your digestive tract.    There is also some evidence that certain bacteria found after a severe gastroinstestinal infection in the small intestine and colon " may cause IBS.    While stress and anxiety worsen the symptoms of IBS, it is not believed to be the cause.   What you can do  Recommendations include:    Certain medicines may help regulate the working of your digestive tract. Your healthcare provider may prescribe one or more for you.    Medicine can t cure IBS, but it may help manage the symptoms.    Because some medicines may make IBS worse, don t take any medicine, especially laxatives, unless your healthcare provider prescribes it for you.    Your healthcare provider may suggest some lifestyle changes to help control your IBS. Two of the most important are changing your diet and managing stress. If diet changes are suggested, ask for nutritional guidance from a dietitian so you maintain a healthy nutritional balance in your food intake.   Date Last Reviewed: 7/1/2016 2000-2018 [employer]. [address] [place] [address], [place] [address]. All rights reserved. This information is not intended as a substitute for professional medical care. Always follow your healthcare professional's instructions.         sssssa

## 2019-04-17 NOTE — NURSING NOTE
"Chief Complaint   Patient presents with     ER F/U     /85 (BP Location: Left arm, Patient Position: Sitting, Cuff Size: Adult Large)   Pulse 71   Temp 97.4  F (36.3  C) (Oral)   Resp 16   Wt 78.5 kg (173 lb)   SpO2 95%   Breastfeeding? No   BMI 24.13 kg/m   Estimated body mass index is 24.13 kg/m  as calculated from the following:    Height as of 1/11/19: 1.803 m (5' 11\").    Weight as of this encounter: 78.5 kg (173 lb).  bp completed using cuff size: large       Health Maintenance addressed:  PHQ9 and GAD7    Possibly completing today per provider review.    Zac Romero MA     "

## 2019-04-17 NOTE — PROGRESS NOTES
SUBJECTIVE:   Elmira Craft is a 28 year old female who presents to clinic today for the following   health issues:        ED/UC Followup:    Facility:  [facility]   Date of visit: 1/11/19  Reason for visit: Abdominal Pain   Current Status: Patient state that she feels better right now--abdominal pain has been ongoing for the last 5 years; GYN originally thought it was related to a cyst but that was removed.    Has had this intermittently for a few years    Was at work felt nausea  Went to bathroom  Had looser stool  And felt extreme tightness along her abdomen and into her back and sweating started  Happens with her period  This first happened 2011 or 2012  Was seen in the ER - had evaluation and no clear causes  Had low pulse at the time  Happened again 4 months later  Saw an OBGYN  Had US and a cyst was found on ovary - small 2 cm and had no endometriosis noted  Then few years later and no concerns  Then again at her job this happened again - shorter episode  Tightness loose stool and period  Had ovarian cyst removed since it had grown  Then a year ago thei happened again  She was advised to consider OCP  Then this happened again in January  Has hda 8 vaginal US and no clear GYN cause.  No recurrent cysts noted  No signs of endometriosis noted.  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     Past Surgical History:   Procedure Laterality Date     ARTHROSCOPY KNEE WITH PATELLAR REALIGNMENT Left 11/24/2015    Procedure: ARTHROSCOPY KNEE WITH PATELLAR REALIGNMENT;  Surgeon: Seth De La Rosa MD;  [facility]      TOOTH EXTRACTION W/FORCEP       LAPAROSCOPY  DIAGNOSTIC (GYN) Left 3/25/2016    Procedure: LAPAROSCOPY DIAGNOSTIC (GYN);  Surgeon: Danya Cain MD;  Location: [facility]       Social History     Tobacco Use     Smoking status: Never Smoker     Smokeless tobacco: Never Used   Substance Use Topics     Alcohol use: Yes     Alcohol/week: 1.5 - 2.5 oz     Comment: occas     Family History   Problem Relation Age of Onset     Depression Mother      Thyroid Disease Mother      Neurologic Disorder Mother      Cancer Mother         skin     Cancer Paternal Grandfather         lymphoma     Breast Cancer Maternal Grandmother 60         Current Outpatient Medications   Medication Sig Dispense Refill     Aspirin-Acetaminophen-Caffeine (EXCEDRIN MIGRAINE PO) Take  by mouth as needed.         ROS:  Constitutional, HEENT, cardiovascular, pulmonary, gi and gu systems are negative, except as otherwise noted.    OBJECTIVE:                                                    /85 (BP Location: Left arm, Patient Position: Sitting, Cuff Size: Adult Large)   Pulse 71   Temp 97.4  F (36.3  C) (Oral)   Resp 16   Wt 78.5 kg (173 lb)   SpO2 95%   Breastfeeding? No   BMI 24.13 kg/m    Body mass index is 24.13 kg/m .  GENERAL APPEARANCE: healthy, alert and no distress  ABDOMEN: soft, nontender, without hepatosplenomegaly or masses and bowel sounds normal    Diagnostic test results:  Diagnostic Test Results:  none      ASSESSMENT/PLAN:                                                    1. Abdominal pain, generalized   advised to try FODMAP reduced diet  Consider IBS  Advised to see GI as well  Has hd multiple evals for GYN cause - fierro snot appear to be related to endometriosis    Did discuss possibility of IUD to see if this is a hormonal issues.  - GASTROENTEROLOGY ADULT REF CONSULT ONLY      Follow up with Provider - as needed     Pili Fam MD  [facility]

## 2019-04-18 ASSESSMENT — ANXIETY QUESTIONNAIRES: GAD7 TOTAL SCORE: 0

## 2019-07-23 NOTE — PROGRESS NOTES
Everything looks good except they did see 'clue cells' on the vaginal swab.  This CAN be a sign of bacterial vaginosis, which is more of a shift in the vaginal jack than an actual infection (and it is NOT an STD).  We usually only treat it if you are having increased vaginal discharge, vaginal irritation, or a change in your vaginal odor.  It is one of the most common cause of vaginitis.  If you are having symptoms- .I do recommend treatment with metronidazole vaginally  Once daily 7 days    Approximately 50 to 75 percent of women with BV are asymptomatic  Follow up as needed     Add Ensure Enlive TID to optimize po intake and provide an additional 350 kcal, 20g protein per serving. Rx: MVI and vitamin C 500mg daily. Encourage po intake at all meals. Add Ensure Enlive TID to optimize po intake and provide an additional 350 kcal, 20g protein per serving. Rx: MVI, continue vitamin C supplementation. Encourage po intake at all meals.

## 2019-09-30 ENCOUNTER — HEALTH MAINTENANCE LETTER (OUTPATIENT)
Age: 29
End: 2019-09-30

## 2019-12-26 ENCOUNTER — TELEPHONE (OUTPATIENT)
Dept: EMERGENCY MEDICINE | Facility: CLINIC | Age: 29
End: 2019-12-26

## 2019-12-26 ENCOUNTER — HOSPITAL ENCOUNTER (EMERGENCY)
Facility: CLINIC | Age: 29
Discharge: HOME | End: 2019-12-26
Attending: EMERGENCY MEDICINE | Admitting: EMERGENCY MEDICINE
Payer: MEDICAID

## 2019-12-26 VITALS
SYSTOLIC BLOOD PRESSURE: 142 MMHG | RESPIRATION RATE: 16 BRPM | OXYGEN SATURATION: 98 % | HEART RATE: 79 BPM | TEMPERATURE: 98 F | DIASTOLIC BLOOD PRESSURE: 89 MMHG

## 2019-12-26 DIAGNOSIS — R19.7 VOMITING AND DIARRHEA: ICD-10-CM

## 2019-12-26 DIAGNOSIS — R11.10 VOMITING AND DIARRHEA: ICD-10-CM

## 2019-12-26 LAB
ALBUMIN SERPL-MCNC: 3.8 G/DL (ref 3.4–5)
ALBUMIN UR-MCNC: 30 MG/DL
ALP SERPL-CCNC: 54 U/L (ref 40–150)
ALT SERPL W P-5'-P-CCNC: 17 U/L (ref 0–50)
AMORPH CRY #/AREA URNS HPF: ABNORMAL /HPF
ANION GAP SERPL CALCULATED.3IONS-SCNC: 8 MMOL/L (ref 3–14)
APPEARANCE UR: ABNORMAL
AST SERPL W P-5'-P-CCNC: 12 U/L (ref 0–45)
BASOPHILS # BLD AUTO: 0 10E9/L (ref 0–0.2)
BASOPHILS NFR BLD AUTO: 0.2 %
BILIRUB SERPL-MCNC: 0.7 MG/DL (ref 0.2–1.3)
BILIRUB UR QL STRIP: NEGATIVE
BUN SERPL-MCNC: 18 MG/DL (ref 7–30)
C COLI+JEJUNI+LARI FUSA STL QL NAA+PROBE: NOT DETECTED
C DIFF TOX B STL QL: NEGATIVE
CALCIUM SERPL-MCNC: 8.4 MG/DL (ref 8.5–10.1)
CHLORIDE SERPL-SCNC: 104 MMOL/L (ref 94–109)
CO2 SERPL-SCNC: 25 MMOL/L (ref 20–32)
COLOR UR AUTO: ABNORMAL
CREAT SERPL-MCNC: 0.53 MG/DL (ref 0.52–1.04)
DIFFERENTIAL METHOD BLD: ABNORMAL
EC STX1 GENE STL QL NAA+PROBE: NOT DETECTED
EC STX2 GENE STL QL NAA+PROBE: NOT DETECTED
ENTERIC PATHOGEN COMMENT: ABNORMAL
EOSINOPHIL # BLD AUTO: 0 10E9/L (ref 0–0.7)
EOSINOPHIL NFR BLD AUTO: 0 %
ERYTHROCYTE [DISTWIDTH] IN BLOOD BY AUTOMATED COUNT: 13.2 % (ref 10–15)
FLUAV+FLUBV AG SPEC QL: NEGATIVE
FLUAV+FLUBV AG SPEC QL: NEGATIVE
GFR SERPL CREATININE-BSD FRML MDRD: >90 ML/MIN/{1.73_M2}
GLUCOSE SERPL-MCNC: 144 MG/DL (ref 70–99)
GLUCOSE UR STRIP-MCNC: 30 MG/DL
HCG SERPL QL: NEGATIVE
HCT VFR BLD AUTO: 41.7 % (ref 35–47)
HGB BLD-MCNC: 13.7 G/DL (ref 11.7–15.7)
HGB UR QL STRIP: ABNORMAL
IMM GRANULOCYTES # BLD: 0 10E9/L (ref 0–0.4)
IMM GRANULOCYTES NFR BLD: 0.3 %
KETONES UR STRIP-MCNC: 40 MG/DL
LEUKOCYTE ESTERASE UR QL STRIP: NEGATIVE
LIPASE SERPL-CCNC: 76 U/L (ref 73–393)
LYMPHOCYTES # BLD AUTO: 0.2 10E9/L (ref 0.8–5.3)
LYMPHOCYTES NFR BLD AUTO: 2.7 %
MCH RBC QN AUTO: 30.2 PG (ref 26.5–33)
MCHC RBC AUTO-ENTMCNC: 32.9 G/DL (ref 31.5–36.5)
MCV RBC AUTO: 92 FL (ref 78–100)
MONOCYTES # BLD AUTO: 0.2 10E9/L (ref 0–1.3)
MONOCYTES NFR BLD AUTO: 2.5 %
MUCOUS THREADS #/AREA URNS LPF: PRESENT /LPF
NEUTROPHILS # BLD AUTO: 5.6 10E9/L (ref 1.6–8.3)
NEUTROPHILS NFR BLD AUTO: 94.3 %
NITRATE UR QL: NEGATIVE
NOROV GI+II ORF1-ORF2 JNC STL QL NAA+PR: ABNORMAL
NRBC # BLD AUTO: 0 10*3/UL
NRBC BLD AUTO-RTO: 0 /100
PH UR STRIP: 5.5 PH (ref 5–7)
PLATELET # BLD AUTO: 231 10E9/L (ref 150–450)
POTASSIUM SERPL-SCNC: 3.6 MMOL/L (ref 3.4–5.3)
PROT SERPL-MCNC: 7.9 G/DL (ref 6.8–8.8)
RBC # BLD AUTO: 4.53 10E12/L (ref 3.8–5.2)
RBC #/AREA URNS AUTO: 31 /HPF (ref 0–2)
RVA NSP5 STL QL NAA+PROBE: NOT DETECTED
SALMONELLA SP RPOD STL QL NAA+PROBE: NOT DETECTED
SHIGELLA SP+EIEC IPAH STL QL NAA+PROBE: NOT DETECTED
SODIUM SERPL-SCNC: 137 MMOL/L (ref 133–144)
SOURCE: ABNORMAL
SP GR UR STRIP: >1.035 (ref 1–1.03)
SPECIMEN SOURCE: NORMAL
SPECIMEN SOURCE: NORMAL
SQUAMOUS #/AREA URNS AUTO: 1 /HPF (ref 0–1)
UROBILINOGEN UR STRIP-MCNC: NORMAL MG/DL (ref 0–2)
V CHOL+PARA RFBL+TRKH+TNAA STL QL NAA+PR: NOT DETECTED
WBC # BLD AUTO: 5.9 10E9/L (ref 4–11)
WBC #/AREA URNS AUTO: 0 /HPF (ref 0–5)
Y ENTERO RECN STL QL NAA+PROBE: NOT DETECTED

## 2019-12-26 PROCEDURE — 96361 HYDRATE IV INFUSION ADD-ON: CPT | Performed by: EMERGENCY MEDICINE

## 2019-12-26 PROCEDURE — 99284 EMERGENCY DEPT VISIT MOD MDM: CPT | Mod: Z6 | Performed by: EMERGENCY MEDICINE

## 2019-12-26 PROCEDURE — 25800030 ZZH RX IP 258 OP 636: Performed by: EMERGENCY MEDICINE

## 2019-12-26 PROCEDURE — 84703 CHORIONIC GONADOTROPIN ASSAY: CPT | Performed by: EMERGENCY MEDICINE

## 2019-12-26 PROCEDURE — 25000128 H RX IP 250 OP 636: Performed by: EMERGENCY MEDICINE

## 2019-12-26 PROCEDURE — 81001 URINALYSIS AUTO W/SCOPE: CPT | Performed by: EMERGENCY MEDICINE

## 2019-12-26 PROCEDURE — 80053 COMPREHEN METABOLIC PANEL: CPT | Performed by: EMERGENCY MEDICINE

## 2019-12-26 PROCEDURE — 87506 IADNA-DNA/RNA PROBE TQ 6-11: CPT | Performed by: EMERGENCY MEDICINE

## 2019-12-26 PROCEDURE — 87804 INFLUENZA ASSAY W/OPTIC: CPT | Performed by: EMERGENCY MEDICINE

## 2019-12-26 PROCEDURE — 96374 THER/PROPH/DIAG INJ IV PUSH: CPT | Performed by: EMERGENCY MEDICINE

## 2019-12-26 PROCEDURE — 83690 ASSAY OF LIPASE: CPT | Performed by: EMERGENCY MEDICINE

## 2019-12-26 PROCEDURE — 99284 EMERGENCY DEPT VISIT MOD MDM: CPT | Mod: 25 | Performed by: EMERGENCY MEDICINE

## 2019-12-26 PROCEDURE — 85025 COMPLETE CBC W/AUTO DIFF WBC: CPT | Performed by: EMERGENCY MEDICINE

## 2019-12-26 PROCEDURE — 87493 C DIFF AMPLIFIED PROBE: CPT | Performed by: EMERGENCY MEDICINE

## 2019-12-26 PROCEDURE — 96375 TX/PRO/DX INJ NEW DRUG ADDON: CPT | Performed by: EMERGENCY MEDICINE

## 2019-12-26 RX ORDER — KETOROLAC TROMETHAMINE 30 MG/ML
30 INJECTION, SOLUTION INTRAMUSCULAR; INTRAVENOUS ONCE
Status: COMPLETED | OUTPATIENT
Start: 2019-12-26 | End: 2019-12-26

## 2019-12-26 RX ORDER — ONDANSETRON 4 MG/1
4 TABLET, ORALLY DISINTEGRATING ORAL EVERY 8 HOURS PRN
Qty: 10 TABLET | Refills: 0 | Status: SHIPPED | OUTPATIENT
Start: 2019-12-26 | End: 2020-03-03

## 2019-12-26 RX ADMIN — KETOROLAC TROMETHAMINE 30 MG: 30 INJECTION, SOLUTION INTRAMUSCULAR at 10:40

## 2019-12-26 RX ADMIN — PROCHLORPERAZINE EDISYLATE 10 MG: 5 INJECTION INTRAMUSCULAR; INTRAVENOUS at 10:36

## 2019-12-26 RX ADMIN — SODIUM CHLORIDE 1000 ML: 9 INJECTION, SOLUTION INTRAVENOUS at 12:10

## 2019-12-26 RX ADMIN — SODIUM CHLORIDE 1000 ML: 9 INJECTION, SOLUTION INTRAVENOUS at 10:33

## 2019-12-26 ASSESSMENT — ENCOUNTER SYMPTOMS
COUGH: 1
FREQUENCY: 0
CONFUSION: 0
SHORTNESS OF BREATH: 0
DIFFICULTY URINATING: 0
HEMATURIA: 0
BACK PAIN: 1
HEADACHES: 1
NECK STIFFNESS: 0
COLOR CHANGE: 0
DYSURIA: 0
VOMITING: 1
ARTHRALGIAS: 0
EYE REDNESS: 0
FEVER: 0
ABDOMINAL PAIN: 1

## 2019-12-26 NOTE — TELEPHONE ENCOUNTER
[facility] Emergency Department Lab result notification:    Lab Result  Final Enteric Bacteria and Virus Panel by MAYCOL Stool is POSITIVE for Norovirus I and II by MAYCOL  Patient to be notified, symptom's assessed and advised per [place] ED lab result protocol.  ED visit Date: 12/26/19  Symptoms reported at ED visit 29 year old female to the emergency department 1 day history of nausea, vomiting, and diarrhea.  She has not been on antibiotics recently denies any ill contacts.  She does not have any abdominal tenderness.  The patient's labs are normal.  Urinalysis appears concentrated but not infected.  She is currently menstruating.  The patient C. difficile testing is negative.  She was treated with IV fluids, Toradol, and Compazine with resolution of her symptoms.  She is tolerating p.o. fluids and was requesting discharged home.  Suspect etiology but stool cultures are pending.  Patient be discharged home.  [place] diet.  Ondansetron prescribed for nausea.  Primary care follow-up in 1 week as needed.  Return precautions provided.      Miscellaneous information      Current symptoms  5:04 pm Message left to call us back at [phone], between 10 am and 6 pm, seven days a week.     Minoo Bryant RN  [facility]   Lung Nodule and ED Lab Result F/U RN  Epic pool (ED late result f/u RN): [id number]  # [phone]

## 2019-12-26 NOTE — ED AVS SNAPSHOT
[facility], [place], Emergency Department  [address]  [place] [address]  Phone:  [phone]  Fax:  [phone]                                    Elmira Craft   MRN: [id number]    Department:  [facility], Emergency Department   Date of Visit:  12/26/2019           After Visit Summary Signature Page    I have received my discharge instructions, and my questions have been answered. I have discussed any challenges I see with this plan with the nurse or doctor.    ..........................................................................................................................................  Patient/Patient Representative Signature      ..........................................................................................................................................  Patient Representative Print Name and Relationship to Patient    ..................................................               ................................................  Date                                   Time    ..........................................................................................................................................  Reviewed by Signature/Title    ...................................................              ..............................................  Date                                               Time          22EPIC Rev 08/18

## 2019-12-26 NOTE — DISCHARGE INSTRUCTIONS
Drink plenty of fluids.  Try a sports drink diluted half with water.  Take ondansetron as needed for nausea.  [place] diet- advance as tolerated.    Follow-up with your primary care provider in 1 week as needed.

## 2019-12-26 NOTE — ED PROVIDER NOTES
[facility] EMERGENCY DEPARTMENT ([facility])    12/26/19     ED 6 9:45 AM   History     Chief Complaint   Patient presents with     Vomiting     vomiting x 2 days. She also reports a headache and back pain     The history is provided by the patient and medical records.     Elmira Craft is a 29 year old female who presents with vomiting and watery diarrhea since 9:30 PM last night. Patient states symptoms started last night with multiple episodes of vomiting and diarrhea. The vomiting stopped briefly but she started vomiting again this morning, but now with small flecks of blood in emesis. At this time her stomach feels empty and she has been having dry heaves at this point. She has a headache at this time, feels that she might be dehydrated because she can't keep fluids down. She has had 3-4 episodes of watery diarrhea today. She thought perhaps this was from food poisoning.  She also has out-of-the-ordinary low back pain with this. She notes prior history of UTIs, but no urinary frequency at this time. Last menstrual period started on 12/23/19. No recent antibiotics. No particular sick contacts. She has had cough productive of mucousy phlegm.     I have reviewed the Medications, Allergies, Past Medical and Surgical History, and Social History in the [employer] system.  PAST MEDICAL HISTORY:   Past Medical History:   Diagnosis Date     Chickenpox      Dysmenorrhea      Migraines      Ovarian cyst 2012       PAST SURGICAL HISTORY:   Past Surgical History:   Procedure Laterality Date     ARTHROSCOPY KNEE WITH PATELLAR REALIGNMENT Left 11/24/2015    Procedure: ARTHROSCOPY KNEE WITH PATELLAR REALIGNMENT;  Surgeon: Seth De La Rosa MD;  Location:  OR      TOOTH EXTRACTION W/FORCEP       LAPAROSCOPY DIAGNOSTIC (GYN) Left 3/25/2016    Procedure: LAPAROSCOPY DIAGNOSTIC (GYN);  Surgeon: Danya Cain MD;  Location: [facility]       FAMILY HISTORY:   Family History   Problem Relation Age of  Onset     Depression Mother      Thyroid Disease Mother      Neurologic Disorder Mother      Cancer Mother         skin     Cancer Paternal Grandfather         lymphoma     Breast Cancer Maternal Grandmother 60       SOCIAL HISTORY:   Social History     Tobacco Use     Smoking status: Never Smoker     Smokeless tobacco: Never Used   Substance Use Topics     Alcohol use: Yes     Alcohol/week: 2.5 - 4.2 standard drinks     Comment: occas       Discharge Medication List as of 12/26/2019  2:44 PM      START taking these medications    Details   ondansetron (ZOFRAN ODT) 4 MG ODT tab Take 1 tablet (4 mg) by mouth every 8 hours as needed for nausea, Disp-10 tablet, R-0, Local Print         CONTINUE these medications which have NOT CHANGED    Details   Aspirin-Acetaminophen-Caffeine (EXCEDRIN MIGRAINE PO) Take  by mouth as needed., Historical                Allergies   Allergen Reactions     Sulfa Drugs Unknown     DURING CHILDHOOD      Review of Systems   Constitutional: Negative for fever.   HENT: Negative for congestion.    Eyes: Negative for redness.   Respiratory: Positive for cough. Negative for shortness of breath.    Cardiovascular: Negative for chest pain.   Gastrointestinal: Positive for abdominal pain and vomiting.   Genitourinary: Negative for difficulty urinating, dysuria, frequency, hematuria and urgency.   Musculoskeletal: Positive for back pain. Negative for arthralgias and neck stiffness.   Skin: Negative for color change.   Neurological: Positive for headaches.   Psychiatric/Behavioral: Negative for confusion.       Physical Exam   BP: 125/79  Pulse: 80  Temp: 97.1  F (36.2  C)  Resp: 16  SpO2: 98 %      Physical Exam  Vitals signs and nursing note reviewed.   Constitutional:       General: She is not in acute distress.     Appearance: Normal appearance. She is not diaphoretic.   HENT:      Head: Atraumatic.      Mouth/Throat:      Mouth: Mucous membranes are dry.      Pharynx: No oropharyngeal exudate.    Eyes:      General: No scleral icterus.     Pupils: Pupils are equal, round, and reactive to light.   Cardiovascular:      Rate and Rhythm: Normal rate and regular rhythm.      Pulses: Normal pulses.      Heart sounds: Normal heart sounds.   Pulmonary:      Effort: Pulmonary effort is normal. No respiratory distress.      Breath sounds: Normal breath sounds.   Abdominal:      General: Bowel sounds are normal.      Palpations: Abdomen is soft.      Tenderness: There is no abdominal tenderness.   Musculoskeletal: Normal range of motion.         General: No tenderness.   Skin:     General: Skin is warm.      Capillary Refill: Capillary refill takes less than 2 seconds.      Findings: No rash.   Neurological:      General: No focal deficit present.      Mental Status: She is alert.         ED Course        Procedures            Critical Care time:             Results for orders placed or performed during the hospital encounter of 12/26/19 (from the past 24 hour(s))   CBC with platelets differential   Result Value Ref Range    WBC 5.9 4.0 - 11.0 10e9/L    RBC Count 4.53 3.8 - 5.2 10e12/L    Hemoglobin 13.7 11.7 - 15.7 g/dL    Hematocrit 41.7 35.0 - 47.0 %    MCV 92 78 - 100 fl    MCH 30.2 26.5 - 33.0 pg    MCHC 32.9 31.5 - 36.5 g/dL    RDW 13.2 10.0 - 15.0 %    Platelet Count 231 150 - 450 10e9/L    Diff Method Automated Method     % Neutrophils 94.3 %    % Lymphocytes 2.7 %    % Monocytes 2.5 %    % Eosinophils 0.0 %    % Basophils 0.2 %    % Immature Granulocytes 0.3 %    Nucleated RBCs 0 0 /100    Absolute Neutrophil 5.6 1.6 - 8.3 10e9/L    Absolute Lymphocytes 0.2 (L) 0.8 - 5.3 10e9/L    Absolute Monocytes 0.2 0.0 - 1.3 10e9/L    Absolute Eosinophils 0.0 0.0 - 0.7 10e9/L    Absolute Basophils 0.0 0.0 - 0.2 10e9/L    Abs Immature Granulocytes 0.0 0 - 0.4 10e9/L    Absolute Nucleated RBC 0.0    Comprehensive metabolic panel   Result Value Ref Range    Sodium 137 133 - 144 mmol/L    Potassium 3.6 3.4 - 5.3 mmol/L     Chloride 104 94 - 109 mmol/L    Carbon Dioxide 25 20 - 32 mmol/L    Anion Gap 8 3 - 14 mmol/L    Glucose 144 (H) 70 - 99 mg/dL    Urea Nitrogen 18 7 - 30 mg/dL    Creatinine 0.53 0.52 - 1.04 mg/dL    GFR Estimate >90 >60 mL/min/[1.73_m2]    GFR Estimate If Black >90 >60 mL/min/[1.73_m2]    Calcium 8.4 (L) 8.5 - 10.1 mg/dL    Bilirubin Total 0.7 0.2 - 1.3 mg/dL    Albumin 3.8 3.4 - 5.0 g/dL    Protein Total 7.9 6.8 - 8.8 g/dL    Alkaline Phosphatase 54 40 - 150 U/L    ALT 17 0 - 50 U/L    AST 12 0 - 45 U/L   Lipase   Result Value Ref Range    Lipase 76 73 - 393 U/L   HCG qualitative Blood   Result Value Ref Range    HCG Qualitative Serum Negative NEG^Negative   Influenza A/B antigen   Result Value Ref Range    Influenza A/B Agn Specimen Nares     Influenza A Negative NEG^Negative    Influenza B Negative NEG^Negative   UA with Microscopic   Result Value Ref Range    Color Urine Orange     Appearance Urine Cloudy     Glucose Urine 30 (A) NEG^Negative mg/dL    Bilirubin Urine Negative NEG^Negative    Ketones Urine 40 (A) NEG^Negative mg/dL    Specific Gravity Urine >1.035 (H) 1.003 - 1.035    Blood Urine Small (A) NEG^Negative    pH Urine 5.5 5.0 - 7.0 pH    Protein Albumin Urine 30 (A) NEG^Negative mg/dL    Urobilinogen mg/dL Normal 0.0 - 2.0 mg/dL    Nitrite Urine Negative NEG^Negative    Leukocyte Esterase Urine Negative NEG^Negative    Source Midstream Urine     WBC Urine 0 0 - 5 /HPF    RBC Urine 31 (H) 0 - 2 /HPF    Squamous Epithelial /HPF Urine 1 0 - 1 /HPF    Mucous Urine Present (A) NEG^Negative /LPF    Amorphous Crystals Moderate (A) NEG^Negative /HPF   Clostridium difficile toxin B PCR   Result Value Ref Range    Specimen Description Feces     C Diff Toxin B PCR Negative NEG^Negative     Medications   0.9% sodium chloride BOLUS (0 mLs Intravenous Stopped 12/26/19 1204)   ketorolac (TORADOL) injection 30 mg (30 mg Intravenous Given 12/26/19 1040)   prochlorperazine (COMPAZINE) injection 10 mg (10 mg  Intravenous Given 12/26/19 1036)   0.9% sodium chloride BOLUS (0 mLs Intravenous Stopped 12/26/19 1423)       2:36 PM Feeling better.  Tolerating PO fluids.        Assessments & Plan (with Medical Decision Making)   29 year old female to the emergency department 1 day history of nausea, vomiting, and diarrhea.  She has not been on antibiotics recently denies any ill contacts.  She does not have any abdominal tenderness.  The patient's labs are normal.  Urinalysis appears concentrated but not infected.  She is currently menstruating.  The patient C. difficile testing is negative.  She was treated with IV fluids, Toradol, and Compazine with resolution of her symptoms.  She is tolerating p.o. fluids and was requesting discharged home.  Suspect etiology but stool cultures are pending.  Patient be discharged home.  Cross diet.  Ondansetron prescribed for nausea.  Primary care follow-up in 1 week as needed.  Return precautions provided.    I have reviewed the nursing notes.    I have reviewed the findings, diagnosis, plan and need for follow up with the patient.    Discharge Medication List as of 12/26/2019  2:44 PM      START taking these medications    Details   ondansetron (ZOFRAN ODT) 4 MG ODT tab Take 1 tablet (4 mg) by mouth every 8 hours as needed for nausea, Disp-10 tablet, R-0, Local Print             Final diagnoses:   Vomiting and diarrhea   Roberta ZAVALETA, am serving as a trained medical scribe to document services personally performed by Hardeep Simon MD based on the provider's statements to me on December 26, 2019.  This document has been checked and approved by the attending provider.    Hardeep ZAVALETA MD, was physically present and have reviewed and verified the accuracy of this note documented by Roberta Ramirez, medical scribe.       12/26/2019   [facility], [place], EMERGENCY DEPARTMENT     Hardeep Simon MD  12/26/19 1377

## 2019-12-27 NOTE — TELEPHONE ENCOUNTER
"[facility] Emergency Department Lab result notification     Patient/parent Name  Elmira     RN Assessment (Patient s current Symptoms), include time called.  [Insert Left message here if message left]  Elmira     Lab result (if applicable):  Final Enteric Bacteria and Virus Panel by MAYCOL Stool is POSITIVE for Norovirus I and II by MAYCOL  Patient to be notified, symptom's assessed and advised per [facility] ED lab result protocol.    RN Recommendations/Instructions per [facility] ED lab result protocol  Feeling \"a little better\", no longer vomiting.   General information including infection control related to Norovirus reviewed.     Please Contact your PCP clinic or return to the Emergency department if your:    Symptoms return.    Symptoms worsen or other concerning symptom's.    PCP follow-up Questions asked: YES       Juliet Berman RN    [facility]   Lung Nodule and ED Lab Results F/U RN  Epic pool (ED late result f/u RN) : [id number]   # [phone]  "

## 2020-03-03 ENCOUNTER — OFFICE VISIT (OUTPATIENT)
Dept: FAMILY MEDICINE | Facility: CLINIC | Age: 30
End: 2020-03-03
Payer: COMMERCIAL

## 2020-03-03 VITALS
HEIGHT: 71 IN | SYSTOLIC BLOOD PRESSURE: 109 MMHG | HEART RATE: 88 BPM | DIASTOLIC BLOOD PRESSURE: 77 MMHG | RESPIRATION RATE: 14 BRPM | TEMPERATURE: 97.5 F | OXYGEN SATURATION: 96 % | WEIGHT: 164.31 LBS | BODY MASS INDEX: 23 KG/M2

## 2020-03-03 DIAGNOSIS — R61 EXCESSIVE SWEATING: Primary | ICD-10-CM

## 2020-03-03 DIAGNOSIS — Z11.3 SCREEN FOR STD (SEXUALLY TRANSMITTED DISEASE): ICD-10-CM

## 2020-03-03 DIAGNOSIS — Z23 NEED FOR TDAP VACCINATION: ICD-10-CM

## 2020-03-03 LAB
ERYTHROCYTE [DISTWIDTH] IN BLOOD BY AUTOMATED COUNT: 13.3 % (ref 10–15)
HCT VFR BLD AUTO: 42.5 % (ref 35–47)
HGB BLD-MCNC: 14 G/DL (ref 11.7–15.7)
MCH RBC QN AUTO: 30.4 PG (ref 26.5–33)
MCHC RBC AUTO-ENTMCNC: 32.9 G/DL (ref 31.5–36.5)
MCV RBC AUTO: 92 FL (ref 78–100)
PLATELET # BLD AUTO: 315 10E9/L (ref 150–450)
RBC # BLD AUTO: 4.61 10E12/L (ref 3.8–5.2)
WBC # BLD AUTO: 5 10E9/L (ref 4–11)

## 2020-03-03 PROCEDURE — 87389 HIV-1 AG W/HIV-1&-2 AB AG IA: CPT | Performed by: FAMILY MEDICINE

## 2020-03-03 PROCEDURE — 86780 TREPONEMA PALLIDUM: CPT | Performed by: FAMILY MEDICINE

## 2020-03-03 PROCEDURE — 80053 COMPREHEN METABOLIC PANEL: CPT | Performed by: FAMILY MEDICINE

## 2020-03-03 PROCEDURE — 99213 OFFICE O/P EST LOW 20 MIN: CPT | Mod: 25 | Performed by: FAMILY MEDICINE

## 2020-03-03 PROCEDURE — 90471 IMMUNIZATION ADMIN: CPT | Performed by: FAMILY MEDICINE

## 2020-03-03 PROCEDURE — 90715 TDAP VACCINE 7 YRS/> IM: CPT | Performed by: FAMILY MEDICINE

## 2020-03-03 PROCEDURE — 84443 ASSAY THYROID STIM HORMONE: CPT | Performed by: FAMILY MEDICINE

## 2020-03-03 PROCEDURE — 85027 COMPLETE CBC AUTOMATED: CPT | Performed by: FAMILY MEDICINE

## 2020-03-03 PROCEDURE — 36415 COLL VENOUS BLD VENIPUNCTURE: CPT | Performed by: FAMILY MEDICINE

## 2020-03-03 ASSESSMENT — MIFFLIN-ST. JEOR: SCORE: 1566.45

## 2020-03-03 ASSESSMENT — PAIN SCALES - GENERAL: PAINLEVEL: NO PAIN (0)

## 2020-03-03 NOTE — PROGRESS NOTES
Subjective   Elmira Craft is a 29 year old female who presents to clinic today for the following health issues:    HPI   Sweating    Duration:  6 months roughly      Description (location/character/radiation): sweating     Intensity:  moderate    Accompanying signs and symptoms: patient mentions that within the last year  She has noticed that her sweat smells different    History (similar episodes/previous evaluation): Mom has thyroid issues    Precipitating or alleviating factors: None    Therapies tried and outcome: patient has tried different deodorants (only one that was not 'natural', though, and only for two days) - not helpful      Noticed within the last ~6 months that she's sweating a lot.  She's doing yoga, gym, dance (sweating more than others, [employer]).  Sweating on forehead, back.  Sweating outside of exercise as well, though not as much as with exercise.  The smell of her sweat has also change and antiperspirants, but has only tired the regular, 'non-natural' options for a few days.    At night, wakes up hot, but not with signifcant sweating, not soaking sheets or needing to change clothes.    Used to exercise and didn't have this sweating issue then.    She'll dance x 3 hrs, drinking water, but she'll still feel she's dehydrated.  Does feel like she's wanting/needing to drink more water.    Travel- none out of the country, no camping.  Wt loss, but mostly due to norovirus 12/19-1/20, losing 9 lbs over that time, but she's up a bit from that time.    Sexual partners- 2 new partners in the last few months.  3 in the last year.  No STD check since those new partners.    Fam h/o- mother hypothyroidism.       Patient Active Problem List   Diagnosis     Patellar dislocation     Patellar fracture     Chronic migraine without aura without status migrainosus, not intractable     Accessory nipple in female     Past Surgical History:   Procedure Laterality Date     ARTHROSCOPY KNEE WITH PATELLAR  "REALIGNMENT Left 11/24/2015    Procedure: ARTHROSCOPY KNEE WITH PATELLAR REALIGNMENT;  Surgeon: Seth De La Rosa MD;  [facility]      TOOTH EXTRACTION W/FORCEP       LAPAROSCOPY DIAGNOSTIC (GYN) Left 3/25/2016    Procedure: LAPAROSCOPY DIAGNOSTIC (GYN);  Surgeon: Danya Cain MD;  Location: [facility]       Social History     Tobacco Use     Smoking status: Never Smoker     Smokeless tobacco: Never Used   Substance Use Topics     Alcohol use: Yes     Alcohol/week: 2.5 - 4.2 standard drinks     Comment: occas     Family History   Problem Relation Age of Onset     Depression Mother      Thyroid Disease Mother      Neurologic Disorder Mother      Cancer Mother         skin     Cancer Paternal Grandfather         lymphoma     Breast Cancer Maternal Grandmother 60         Current Outpatient Medications   Medication Sig Dispense Refill     Aspirin-Acetaminophen-Caffeine (EXCEDRIN MIGRAINE PO) Take  by mouth as needed.       Allergies   Allergen Reactions     Sulfa Drugs Unknown     DURING CHILDHOOD     Recent Labs   Lab Test 03/03/20  1148 12/26/19  1011 02/13/19  2348   ALT 18 17 24   CR 0.63 0.53 0.56   GFRESTIMATED >90 >90 >90   GFRESTBLACK >90 >90 >90   POTASSIUM 3.9 3.6 3.6   TSH 1.00  --   --       BP Readings from Last 3 Encounters:   03/03/20 109/77   12/26/19 (!) 142/89   04/17/19 124/85    Wt Readings from Last 3 Encounters:   03/03/20 74.5 kg (164 lb 5 oz)   04/17/19 78.5 kg (173 lb)   02/13/19 76.2 kg (168 lb)                 Reviewed and updated as needed this visit by Provider  Tobacco  Allergies  Meds  Problems  Med Hx  Surg Hx  Fam Hx         Review of Systems   ROS COMP: Constitutional, HEENT, cardiovascular, pulmonary, gi and gu systems are negative, except as otherwise noted.      Objective    /77 (BP Location: Left arm, Patient Position: Sitting, Cuff Size: Adult Regular)   Pulse 88   Temp 97.5  F (36.4  C) (Oral)   Resp 14   Ht 1.803 m (5' 11\")   Wt 74.5 " kg (164 lb 5 oz)   LMP 02/12/2020   SpO2 96%   Breastfeeding No   BMI 22.92 kg/m    Body mass index is 22.92 kg/m .  Physical Exam   GENERAL APPEARANCE: healthy, alert and no distress     EYES: PERRL, sclera clear     HENT: nose and mouth without ulcers or lesions     NECK: no adenopathy, no asymmetry, masses, or scars and thyroid normal to palpation     RESP: lungs clear to auscultation - no rales, rhonchi or wheezes     CV: regular rates and rhythm, normal S1 S2, no S3 or S4 and no murmur, click or rub      Abdomen: soft, nontender, no HSM or masses and bowel sounds normal     Ext: warm, dry, no edema      Psych: full range affect, normal speech and grooming, judgement and insight intact          Diagnostic Test Results:  Labs reviewed in Epic  Results for orders placed or performed in visit on 03/03/20   TSH with free T4 reflex     Status: None   Result Value Ref Range    TSH 1.00 0.40 - 4.00 mU/L   CBC with platelets     Status: None   Result Value Ref Range    WBC 5.0 4.0 - 11.0 10e9/L    RBC Count 4.61 3.8 - 5.2 10e12/L    Hemoglobin 14.0 11.7 - 15.7 g/dL    Hematocrit 42.5 35.0 - 47.0 %    MCV 92 78 - 100 fl    MCH 30.4 26.5 - 33.0 pg    MCHC 32.9 31.5 - 36.5 g/dL    RDW 13.3 10.0 - 15.0 %    Platelet Count 315 150 - 450 10e9/L   Comprehensive metabolic panel (BMP + Alb, Alk Phos, ALT, AST, Total. Bili, TP)     Status: None   Result Value Ref Range    Sodium 137 133 - 144 mmol/L    Potassium 3.9 3.4 - 5.3 mmol/L    Chloride 107 94 - 109 mmol/L    Carbon Dioxide 25 20 - 32 mmol/L    Anion Gap 5 3 - 14 mmol/L    Glucose 76 70 - 99 mg/dL    Urea Nitrogen 15 7 - 30 mg/dL    Creatinine 0.63 0.52 - 1.04 mg/dL    GFR Estimate >90 >60 mL/min/[1.73_m2]    GFR Estimate If Black >90 >60 mL/min/[1.73_m2]    Calcium 9.1 8.5 - 10.1 mg/dL    Bilirubin Total 0.6 0.2 - 1.3 mg/dL    Albumin 4.2 3.4 - 5.0 g/dL    Protein Total 8.2 6.8 - 8.8 g/dL    Alkaline Phosphatase 51 40 - 150 U/L    ALT 18 0 - 50 U/L    AST 13 0 - 45  U/L   HIV Antigen Antibody Combo     Status: None   Result Value Ref Range    HIV Antigen Antibody Combo Nonreactive NR^Nonreactive       Treponema Abs w Reflex to RPR and Titer     Status: None   Result Value Ref Range    Treponema Antibodies Nonreactive NR^Nonreactive           Assessment & Plan       ICD-10-CM    1. Excessive sweating R61 TSH with free T4 reflex     CBC with platelets     Comprehensive metabolic panel (BMP + Alb, Alk Phos, ALT, AST, Total. Bili, TP)     HIV Antigen Antibody Combo     DERMATOLOGY REFERRAL   2. Screen for STD (sexually transmitted disease) Z11.3 HIV Antigen Antibody Combo     Treponema Abs w Reflex to RPR and Titer     NEISSERIA GONORRHOEA PCR     CHLAMYDIA TRACHOMATIS PCR     CANCELED: NEISSERIA GONORRHOEA PCR     CANCELED: CHLAMYDIA TRACHOMATIS PCR     CANCELED: CHLAMYDIA TRACHOMATIS PCR     CANCELED: NEISSERIA GONORRHOEA PCR   3. Need for Tdap vaccination Z23 TDAP, IM (10 - 64 YRS) - Adacel     28 yo female with concerns due to an increase in generalized sweating, some at rest, though most increase noticed during exercise.  Feels more hot at night at times, but not having hot flashes or sweating in clothes/sheets, not needing to change.  No other generalized sx's, no unintended weight loss or fatigue.  Has had new sexual partners without a recent STD screen- will do that, and check other labs as above.  If labs fine, can f/u with dermatology for their thoughts.  Rec using calcium based anti-persperant/deoderant for her axillary sx's.  Pt agrees with plan.        Return in about 3 months (around 6/3/2020) for Physical-but return soon if symptoms not improving.    Marilu Hart MD  [facility]

## 2020-03-03 NOTE — NURSING NOTE
"Chief Complaint   Patient presents with     Excessive Sweating     /77 (BP Location: Left arm, Patient Position: Sitting, Cuff Size: Adult Regular)   Pulse 88   Temp 97.5  F (36.4  C) (Oral)   Resp 14   Ht 1.803 m (5' 11\")   Wt 74.5 kg (164 lb 5 oz)   LMP 02/12/2020   SpO2 96%   Breastfeeding No   BMI 22.92 kg/m   Estimated body mass index is 22.92 kg/m  as calculated from the following:    Height as of this encounter: 1.803 m (5' 11\").    Weight as of this encounter: 74.5 kg (164 lb 5 oz).  bp completed using cuff size: regular      Health Maintenance addressed:  NONE    N/a  Emili Rush CMA on 3/3/2020 at 11:20 AM                "

## 2020-03-04 LAB
ALBUMIN SERPL-MCNC: 4.2 G/DL (ref 3.4–5)
ALP SERPL-CCNC: 51 U/L (ref 40–150)
ALT SERPL W P-5'-P-CCNC: 18 U/L (ref 0–50)
ANION GAP SERPL CALCULATED.3IONS-SCNC: 5 MMOL/L (ref 3–14)
AST SERPL W P-5'-P-CCNC: 13 U/L (ref 0–45)
BILIRUB SERPL-MCNC: 0.6 MG/DL (ref 0.2–1.3)
BUN SERPL-MCNC: 15 MG/DL (ref 7–30)
CALCIUM SERPL-MCNC: 9.1 MG/DL (ref 8.5–10.1)
CHLORIDE SERPL-SCNC: 107 MMOL/L (ref 94–109)
CO2 SERPL-SCNC: 25 MMOL/L (ref 20–32)
CREAT SERPL-MCNC: 0.63 MG/DL (ref 0.52–1.04)
GFR SERPL CREATININE-BSD FRML MDRD: >90 ML/MIN/{1.73_M2}
GLUCOSE SERPL-MCNC: 76 MG/DL (ref 70–99)
HIV 1+2 AB+HIV1 P24 AG SERPL QL IA: NONREACTIVE
POTASSIUM SERPL-SCNC: 3.9 MMOL/L (ref 3.4–5.3)
PROT SERPL-MCNC: 8.2 G/DL (ref 6.8–8.8)
SODIUM SERPL-SCNC: 137 MMOL/L (ref 133–144)
T PALLIDUM AB SER QL: NONREACTIVE
TSH SERPL DL<=0.005 MIU/L-ACNC: 1 MU/L (ref 0.4–4)

## 2020-03-09 NOTE — RESULT ENCOUNTER NOTE
Here are your lab results from your recent visit...  -Your HIV and syphilis labs were both negative.  -Your TSH (thyroid stimulating hormone, which is elevated in hypothyroidism, and lowered in hyperthyroidism) looks good.  -Your CBC labs (which includes blood labs looking for signs of infection or anemia) was normal.  -Your CMP (which includes electrolyte levels, blood sugar levels, and kidney and liver function tests) was also normal.    Please let me know if you have any questions.  Best,   Aman Hart MD

## 2021-01-15 ENCOUNTER — HEALTH MAINTENANCE LETTER (OUTPATIENT)
Age: 31
End: 2021-01-15

## 2021-10-24 ENCOUNTER — HEALTH MAINTENANCE LETTER (OUTPATIENT)
Age: 31
End: 2021-10-24

## 2022-02-13 ENCOUNTER — HEALTH MAINTENANCE LETTER (OUTPATIENT)
Age: 32
End: 2022-02-13

## 2022-07-26 NOTE — TELEPHONE ENCOUNTER
DIAGNOSIS: ankles in pain / no images / self   APPOINTMENT DATE: 8.9.22   NOTES STATUS DETAILS   MEDICATION LIST Internal

## 2022-08-08 NOTE — PROGRESS NOTES
ASSESSMENT/PLAN:    (M76.62) Tendonitis, Achilles, left  (primary encounter diagnosis)  Comment: exam consistent w/ achilles tendonitis/ peroneal tendonitis; much of her L-sided symptom are likely due to alignment and asymmetry issues that have developed s/p knee dislocation several years ago. She also notes generalized joint laxity which may be contributing to her pain; will f/u in 6 wks; if no better, consider further imaging of L ankle   Plan: Physical Therapy Referral, diclofenac (VOLTAREN) 1 % topical gel          (M76.72) Peroneal tendonitis of left lower extremity  Comment: see above  Plan: Physical Therapy Referral, diclofenac (VOLTAREN) 1 % topical gel                Attestation:  This patient has been seen and evaluated by me, Nic Cortez MD with the resident, Dr Forrest and the care team. I agree with the findings and plan of care as documented in this note.    Nic Cortez MD  August 9, 2022  10:45 AM        Pt is a 31 year old female here today for: Ankle pain    Was at a wedding on the the Saturday before the symptoms started. Was wearing high heels/walking barefoot. Symptoms then started on two days later. Went to ortho urgent care ([facility] ortho).  Saw PT one week ago.     Left Foot/Ankle pain:   Location: Posterior aspect of ankle  Duration: 3 weeks   Trauma/ Fall? No known  Able to walk? Yes, very painful  Swelling? No  Bruising? No  Numbness/ Tingling?  No   Weakness? No   Instability? No   Snapping/Clicking? No  Imaging? No   Treatment? Wore an aircast for 10 days with heel lift. Ice, tylenol helped. PT has helped.       Past Medical History:   Diagnosis Date     Chickenpox      Dysmenorrhea      Migraines      Ovarian cyst 2012      Past Surgical History:   Procedure Laterality Date     ARTHROSCOPY KNEE WITH PATELLAR REALIGNMENT Left 11/24/2015    Procedure: ARTHROSCOPY KNEE WITH PATELLAR REALIGNMENT;  Surgeon: Seth De La Rosa MD;  [facility]      TOOTH EXTRACTION  "W/FORCEP       LAPAROSCOPY DIAGNOSTIC (GYN) Left 3/25/2016    Procedure: LAPAROSCOPY DIAGNOSTIC (GYN);  Surgeon: Danya Cain MD;  Location: [facility]      Current Outpatient Medications   Medication Sig Dispense Refill     Aspirin-Acetaminophen-Caffeine (EXCEDRIN MIGRAINE PO) Take  by mouth as needed.       nitroFURantoin macrocrystal (MACRODANTIN) 50 MG capsule        SUMAtriptan (IMITREX) 50 MG tablet         Allergies   Allergen Reactions     Sulfa Drugs Unknown     DURING CHILDHOOD      ROS:   Gen- no fevers/chills   Rheum - no morning stiffness   Derm - no rash/ redness   Neuro - no numbness, no tingling   Remainder of ROS negative.     Exam:   Ht 1.803 m (5' 11\")   Wt 81.6 kg (180 lb)   BMI 25.10 kg/m         L Foot/Ankle:   Inspection: Swelling - No; Bruising - NO   Sensation: intact in peroneal, tibial, sural, and saphenous distribution   ROM: Dorsiflexion - Full; Plantarflexion - full; Inversion -full; Eversion - full;    Strength: 5/5 in all motions; Pain w/ resisted eversion  Bony tenderness: Medial malleolus? - No; Lateral malleolus? - no; Navicular? - no; 5th Metatarsal? - no; Other - no  Ligaments Tenderness: ATFL/CFL -no; Deltoid - no; Syndesmosis - no; LisFranc - no  Tendons: Posterior Tibialis - no; Peroneals - YES; Achilles - no   Maneuvers: Able to walk on toes; Novak - intact; Hop- deferred    "

## 2022-08-09 ENCOUNTER — PRE VISIT (OUTPATIENT)
Dept: ORTHOPEDICS | Facility: CLINIC | Age: 32
End: 2022-08-09

## 2022-08-09 ENCOUNTER — TELEPHONE (OUTPATIENT)
Dept: FAMILY MEDICINE | Facility: CLINIC | Age: 32
End: 2022-08-09

## 2022-08-09 ENCOUNTER — OFFICE VISIT (OUTPATIENT)
Dept: ORTHOPEDICS | Facility: CLINIC | Age: 32
End: 2022-08-09
Payer: COMMERCIAL

## 2022-08-09 VITALS — BODY MASS INDEX: 25.2 KG/M2 | WEIGHT: 180 LBS | HEIGHT: 71 IN

## 2022-08-09 DIAGNOSIS — M76.62 TENDONITIS, ACHILLES, LEFT: Primary | ICD-10-CM

## 2022-08-09 DIAGNOSIS — M76.72 PERONEAL TENDONITIS OF LEFT LOWER EXTREMITY: ICD-10-CM

## 2022-08-09 PROCEDURE — 99203 OFFICE O/P NEW LOW 30 MIN: CPT | Mod: GC | Performed by: FAMILY MEDICINE

## 2022-08-09 RX ORDER — SUMATRIPTAN 50 MG/1
TABLET, FILM COATED ORAL
COMMUNITY
Start: 2022-05-04

## 2022-08-09 RX ORDER — NITROFURANTOIN MACROCRYSTALS 50 MG/1
CAPSULE ORAL
COMMUNITY
Start: 2021-09-28

## 2022-08-09 NOTE — LETTER
Date:August 10, 2022      Patient was self referred, no letter generated. Do not send.        [facility] Health Information

## 2022-08-09 NOTE — LETTER
8/9/2022      RE: Elmira Craft  [address]  [place] [address]     Dear Colleague,    Thank you for referring your patient, Elmira Craft, to the [facility] [place]. Please see a copy of my visit note below.    ASSESSMENT/PLAN:    (M76.62) Tendonitis, Achilles, left  (primary encounter diagnosis)  Comment: exam consistent w/ achilles tendonitis/ peroneal tendonitis; much of her L-sided symptom are likely due to alignment and asymmetry issues that have developed s/p knee dislocation several years ago. She also notes generalized joint laxity which may be contributing to her pain; will f/u in 6 wks; if no better, consider further imaging of L ankle   Plan: Physical Therapy Referral, diclofenac (VOLTAREN) 1 % topical gel          (M76.72) Peroneal tendonitis of left lower extremity  Comment: see above  Plan: Physical Therapy Referral, diclofenac (VOLTAREN) 1 % topical gel                Attestation:  This patient has been seen and evaluated by me, Nic Cortez MD with the resident, Dr Forrest and the care team. I agree with the findings and plan of care as documented in this note.    Nic Cortez MD  August 9, 2022  10:45 AM        Pt is a 31 year old female here today for: Ankle pain    Was at a wedding on the the Saturday before the symptoms started. Was wearing high heels/walking barefoot. Symptoms then started on two days later. Went to ortho urgent care ([facility] ortho).  Saw PT one week ago.     Left Foot/Ankle pain:   Location: Posterior aspect of ankle  Duration: 3 weeks   Trauma/ Fall? No known  Able to walk? Yes, very painful  Swelling? No  Bruising? No  Numbness/ Tingling?  No   Weakness? No   Instability? No   Snapping/Clicking? No  Imaging? No   Treatment? Wore an aircast for 10 days with heel lift. Ice, tylenol helped. PT has helped.       Past Medical History:   Diagnosis Date     Chickenpox      Dysmenorrhea      Migraines      Ovarian  "cyst 2012      Past Surgical History:   Procedure Laterality Date     ARTHROSCOPY KNEE WITH PATELLAR REALIGNMENT Left 11/24/2015    Procedure: ARTHROSCOPY KNEE WITH PATELLAR REALIGNMENT;  Surgeon: Seth De La Rosa MD;  [facility]      TOOTH EXTRACTION W/FORCEP       LAPAROSCOPY DIAGNOSTIC (GYN) Left 3/25/2016    Procedure: LAPAROSCOPY DIAGNOSTIC (GYN);  Surgeon: Danya Cain MD;  Location: [facility]      Current Outpatient Medications   Medication Sig Dispense Refill     Aspirin-Acetaminophen-Caffeine (EXCEDRIN MIGRAINE PO) Take  by mouth as needed.       nitroFURantoin macrocrystal (MACRODANTIN) 50 MG capsule        SUMAtriptan (IMITREX) 50 MG tablet         Allergies   Allergen Reactions     Sulfa Drugs Unknown     DURING CHILDHOOD      ROS:   Gen- no fevers/chills   Rheum - no morning stiffness   Derm - no rash/ redness   Neuro - no numbness, no tingling   Remainder of ROS negative.     Exam:   Ht 1.803 m (5' 11\")   Wt 81.6 kg (180 lb)   BMI 25.10 kg/m         L Foot/Ankle:   Inspection: Swelling - No; Bruising - NO   Sensation: intact in peroneal, tibial, sural, and saphenous distribution   ROM: Dorsiflexion - Full; Plantarflexion - full; Inversion -full; Eversion - full;    Strength: 5/5 in all motions; Pain w/ resisted eversion  Bony tenderness: Medial malleolus? - No; Lateral malleolus? - no; Navicular? - no; 5th Metatarsal? - no; Other - no  Ligaments Tenderness: ATFL/CFL -no; Deltoid - no; Syndesmosis - no; LisFranc - no  Tendons: Posterior Tibialis - no; Peroneals - YES; Achilles - no   Maneuvers: Able to walk on toes; Novak - intact; Hop- deferred        Again, thank you for allowing me to participate in the care of your patient.      Sincerely,    Nic Cortez MD    "

## 2022-08-11 NOTE — TELEPHONE ENCOUNTER
ATC discussed with Dr. Cortez and he would prefer patient to purchase OTC Voltaren gel at any of her local pharmacy's.     ATC LVM for patient informing her of denial and recommended to purchase OTC brand. Provided call back number of [phone].    ISABEL Eid

## 2022-08-11 NOTE — TELEPHONE ENCOUNTER
Central Prior Authorization Team   Phone: [phone]      PRIOR AUTHORIZATION DENIED    Medication: diclofenac (VOLTAREN) 1 % topical gel - EPA DENIED    Denial Date: 8/9/2022    Denial Rational:           Appeal Information: If the Provider/Patient would like to appeal this denial, please provide a letter of medical necessity explaining why Preferred Formulary medications are not appropriate in the treatment of the patient's condition; along with any previous therapies tried/failed.    Once completed, please route back to me directly: Lily Wilson

## 2022-09-19 ENCOUNTER — OFFICE VISIT (OUTPATIENT)
Dept: ORTHOPEDICS | Facility: CLINIC | Age: 32
End: 2022-09-19
Payer: COMMERCIAL

## 2022-09-19 VITALS — WEIGHT: 180 LBS | BODY MASS INDEX: 25.2 KG/M2 | HEIGHT: 71 IN

## 2022-09-19 DIAGNOSIS — M79.675 GREAT TOE PAIN, LEFT: ICD-10-CM

## 2022-09-19 DIAGNOSIS — M76.72 PERONEAL TENDONITIS OF LEFT LOWER EXTREMITY: ICD-10-CM

## 2022-09-19 DIAGNOSIS — M76.62 TENDONITIS, ACHILLES, LEFT: ICD-10-CM

## 2022-09-19 DIAGNOSIS — M25.872 SESAMOIDITIS OF LEFT FOOT: Primary | ICD-10-CM

## 2022-09-19 PROCEDURE — 99214 OFFICE O/P EST MOD 30 MIN: CPT | Performed by: FAMILY MEDICINE

## 2022-09-19 NOTE — LETTER
9/19/2022      RE: Elmira Craft  [address]  [place] [address]     Dear Colleague,    Thank you for referring your patient, Elmira Craft, to the [facility] [place]. Please see a copy of my visit note below.    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 She has had improved pain with this. Reports mild pain 1/10.   Was going weekly to PT, then went to [facility] and took a break   Still working w/ a  and they are coordinating workouts       Per my last note:  (M76.62) Tendonitis, Achilles, left  (primary encounter diagnosis)  Comment: exam consistent w/ achilles tendonitis/ peroneal tendonitis; much of her L-sided symptom are likely due to alignment and asymmetry issues that have developed s/p knee dislocation several years ago. She also notes generalized joint laxity which may be contributing to her pain; will f/u in 6 wks; if no better, consider further imaging of L ankle   Plan: Physical Therapy Referral, diclofenac (VOLTAREN) 1 % topical gel          (M76.72) Peroneal tendonitis of left lower extremity  Comment: see above  Plan: Physical Therapy Referral, diclofenac (VOLTAREN) 1 % topical gel    Past Medical History:   Diagnosis Date     Chickenpox      Dysmenorrhea  "     Migraines      Ovarian cyst 2012      Current Outpatient Medications   Medication Sig Dispense Refill     Aspirin-Acetaminophen-Caffeine (EXCEDRIN MIGRAINE PO) Take  by mouth as needed.       diclofenac (VOLTAREN) 1 % topical gel Apply 2 g topically 4 times daily 100 g 1     nitroFURantoin macrocrystal (MACRODANTIN) 50 MG capsule        SUMAtriptan (IMITREX) 50 MG tablet         Allergies   Allergen Reactions     Sulfa Drugs Unknown     DURING CHILDHOOD      ROS:   Gen- no fevers/chills   Neuro - no numbness, no tingling   Remainder of ROS negative.     Exam:   Ht 1.803 m (5' 11\")   Wt 81.6 kg (180 lb)   BMI 25.10 kg/m      L foot:  +TTP at sesamoids  Mild pain w/ toe extension  Pain w/ toe-walking    L ankle:  Mild pain over peroneal tract w/ resisted eversion        Xray of L foot and ankle on September 19, 2022 at [facility] [place] - films personally reviewed with patient at time of visit     My impression: Negative         Again, thank you for allowing me to participate in the care of your patient.      Sincerely,    Nic Cortez MD    "

## 2022-09-19 NOTE — LETTER
Date:September 20, 2022      Patient was self referred, no letter generated. Do not send.        [facility] Health Information

## 2022-09-19 NOTE — PROGRESS NOTES
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 She has had improved pain with this. Reports mild pain 1/10.   Was going weekly to PT, then went to [facility] and took a break   Still working w/ a  and they are coordinating workouts       Per my last note:  (M76.62) Tendonitis, Achilles, left  (primary encounter diagnosis)  Comment: exam consistent w/ achilles tendonitis/ peroneal tendonitis; much of her L-sided symptom are likely due to alignment and asymmetry issues that have developed s/p knee dislocation several years ago. She also notes generalized joint laxity which may be contributing to her pain; will f/u in 6 wks; if no better, consider further imaging of L ankle   Plan: Physical Therapy Referral, diclofenac (VOLTAREN) 1 % topical gel          (M76.72) Peroneal tendonitis of left lower extremity  Comment: see above  Plan: Physical Therapy Referral, diclofenac (VOLTAREN) 1 % topical gel    Past Medical History:   Diagnosis Date     Chickenpox      Dysmenorrhea      Migraines      Ovarian cyst 2012      Current Outpatient Medications   Medication Sig Dispense Refill     Aspirin-Acetaminophen-Caffeine (EXCEDRIN MIGRAINE PO) Take  by mouth as needed.       diclofenac (VOLTAREN) 1 % topical gel Apply 2 g topically 4 times daily 100 g 1  "    nitroFURantoin macrocrystal (MACRODANTIN) 50 MG capsule        SUMAtriptan (IMITREX) 50 MG tablet         Allergies   Allergen Reactions     Sulfa Drugs Unknown     DURING CHILDHOOD      ROS:   Gen- no fevers/chills   Neuro - no numbness, no tingling   Remainder of ROS negative.     Exam:   Ht 1.803 m (5' 11\")   Wt 81.6 kg (180 lb)   BMI 25.10 kg/m      L foot:  +TTP at sesamoids  Mild pain w/ toe extension  Pain w/ toe-walking    L ankle:  Mild pain over peroneal tract w/ resisted eversion        Xray of L foot and ankle on September 19, 2022 at [facility] location - films personally reviewed with patient at time of visit     My impression: Negative     "

## 2022-09-22 ENCOUNTER — ANCILLARY PROCEDURE (OUTPATIENT)
Dept: MRI IMAGING | Facility: CLINIC | Age: 32
End: 2022-09-22
Attending: FAMILY MEDICINE
Payer: COMMERCIAL

## 2022-09-22 DIAGNOSIS — M25.872 SESAMOIDITIS OF LEFT FOOT: ICD-10-CM

## 2022-09-22 PROCEDURE — 73718 MRI LOWER EXTREMITY W/O DYE: CPT | Mod: LT | Performed by: RADIOLOGY

## 2022-09-26 NOTE — PROGRESS NOTES
Call start: 11:06 AM   Call stop: 11:12 AM  Call duration: 6 min      ASSESSMENT/PLAN:    (M79.675) Great toe pain, left  (primary encounter diagnosis)  Comment: reviewed imaging findings; soft tissue and bony edema likely related to mechanics and overuse; she tried a dancers pad in past w/ some relief; we discussed whether a more custom pad or insert would benefit her; will refer to podiatry for further tx options; precautions given  Plan: Orthopedic  Referral          Nic Cortez MD  September 27, 2022  11:13 AM      Pt is a 32 year old female last seen on 9/19/22 evaluated via telephone encounter for follow up of:     L foot pain - reviewing MRI  Has a sitting job  Noted pain prior to starting PT for ankle so wondering if more chronic than compensatory      MRI L foot 9/22/22:  Findings:     Bones     Pulmonary edema within the central aspect of the plantar first  metatarsal head. Normal hallux sesamoids without edema.     Joints and periarticular soft tissue     Joint effusion: Physiologic amount of joint fluid are present.     Plantar plates: Intersesamoidal ligament and sesamoidal phalangeal  ligaments of the first metatarsophalangeal joints are intact. Plantar  plates of the second through fifth toe at metatarsophalangeal joints  are grossly intact.     Intermetatarsal spaces: No interdigital neuroma. Intermetatarsal  bursal fluid in the first interspace.     Ligaments and Tendons     Lisfranc interosseous ligament: Intact.     Tendons: The visualized courses of flexor and extensor tendons are intact.   Muscles   Edema in the adductor hallucis longus.     ANCILLARY FINDINGS     Trace soft tissue edema plantar to the first metatarsal head, likely  mild adventitial bursitis.                                                                      Impression:     1. Normal appearance of the hallux sesamoids, as questioned.   2. Edema in the plantar aspect of the first metatarsal head,  likely  degenerative.   3. First intermetatarsal bursitis.   4. Trace soft tissue edema plantar to the first metatarsal head,  likely mild adventitial bursitis.   5. Mild strain of the adductor hallucis longus.    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; will check MRI and do a virtual follow-up  Plan: MR Foot Left w/o Contrast          (M79.675) Great toe pain, left  Comment: see above  Plan: X-ray lt Foot G/E 3 vws*          (M76.72) Peroneal tendonitis of left lower extremity  Comment: improving w/ PT  Plan: X-ray lt ankle G/E 3 views*          (M76.62) Tendonitis, Achilles, left  Comment:   Plan: improving w/ PT    Past Medical History:   Diagnosis Date     Chickenpox      Dysmenorrhea      Migraines      Ovarian cyst 2012      Current Outpatient Medications   Medication Sig Dispense Refill     Aspirin-Acetaminophen-Caffeine (EXCEDRIN MIGRAINE PO) Take  by mouth as needed.       diclofenac (VOLTAREN) 1 % topical gel Apply 2 g topically 4 times daily 100 g 1     nitroFURantoin macrocrystal (MACRODANTIN) 50 MG capsule        SUMAtriptan (IMITREX) 50 MG tablet         Allergies   Allergen Reactions     Sulfa Drugs Unknown     DURING CHILDHOOD      ROS:   Gen- no fevers/chills   Remainder of ROS negative.     Exam:   Deferred as telephone visit

## 2022-09-27 ENCOUNTER — VIRTUAL VISIT (OUTPATIENT)
Dept: ORTHOPEDICS | Facility: CLINIC | Age: 32
End: 2022-09-27
Payer: COMMERCIAL

## 2022-09-27 DIAGNOSIS — M79.675 GREAT TOE PAIN, LEFT: Primary | ICD-10-CM

## 2022-09-27 PROCEDURE — 99213 OFFICE O/P EST LOW 20 MIN: CPT | Mod: 95 | Performed by: FAMILY MEDICINE

## 2022-09-27 NOTE — LETTER
9/27/2022       RE: Elmira Craft  [address]  [place] [address]     Dear Colleague,    Thank you for referring your patient, Elmira Craft, to the [facility] [place] [facility]. Please see a copy of my visit note below.    Call start: 11:06 AM   Call stop: 11:12 AM  Call duration: 6 min      ASSESSMENT/PLAN:    (M79.475) Great toe pain, left  (primary encounter diagnosis)  Comment: reviewed imaging findings; soft tissue and bony edema likely related to mechanics and overuse; she tried a dancers pad in past w/ some relief; we discussed whether a more custom pad or insert would benefit her; will refer to podiatry for further tx options; precautions given  Plan: Orthopedic  Referral          Nic Cortze MD  September 27, 2022  11:13 AM      Pt is a 32 year old female last seen on 9/19/22 evaluated via telephone encounter for follow up of:     L foot pain - reviewing MRI  Has a sitting job  Noted pain prior to starting PT for ankle so wondering if more chronic than compensatory      MRI L foot 9/22/22:  Findings:     Bones     Pulmonary edema within the central aspect of the plantar first  metatarsal head. Normal hallux sesamoids without edema.     Joints and periarticular soft tissue     Joint effusion: Physiologic amount of joint fluid are present.     Plantar plates: Intersesamoidal ligament and sesamoidal phalangeal  ligaments of the first metatarsophalangeal joints are intact. Plantar  plates of the second through fifth toe at metatarsophalangeal joints  are grossly intact.     Intermetatarsal spaces: No interdigital neuroma. Intermetatarsal  bursal fluid in the first interspace.     Ligaments and Tendons     Lisfranc interosseous ligament: Intact.     Tendons: The visualized courses of flexor and extensor tendons are intact.   Muscles   Edema in the adductor hallucis longus.     ANCILLARY  FINDINGS     Trace soft tissue edema plantar to the first metatarsal head, likely  mild adventitial bursitis.                                                                      Impression:     1. Normal appearance of the hallux sesamoids, as questioned.   2. Edema in the plantar aspect of the first metatarsal head, likely  degenerative.   3. First intermetatarsal bursitis.   4. Trace soft tissue edema plantar to the first metatarsal head,  likely mild adventitial bursitis.   5. Mild strain of the adductor hallucis longus.    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 and do a virtual follow-up  Plan: MR Foot Left w/o Contrast          (M79.675) Great toe pain, left  Comment: see above  Plan: X-ray lt Foot G/E 3 vws*          (M76.72) Peroneal tendonitis of left lower extremity  Comment: improving w/ PT  Plan: X-ray lt ankle G/E 3 views*          (M76.62) Tendonitis, Achilles, left  Comment:   Plan: improving w/ PT    Past Medical History:   Diagnosis Date     Chickenpox      Dysmenorrhea      Migraines      Ovarian cyst 2012      Current Outpatient Medications   Medication Sig Dispense Refill     Aspirin-Acetaminophen-Caffeine (EXCEDRIN MIGRAINE PO) Take  by mouth as needed.       diclofenac (VOLTAREN) 1 % topical gel Apply 2 g topically 4 times daily 100 g 1     nitroFURantoin macrocrystal (MACRODANTIN) 50 MG capsule        SUMAtriptan (IMITREX) 50 MG tablet         Allergies   Allergen Reactions     Sulfa Drugs Unknown     DURING CHILDHOOD      ROS:   Gen- no fevers/chills   Remainder of ROS negative.     Exam:   Deferred as telephone visit            Again, thank you for allowing me to participate in the care of your patient.      Sincerely,    iNc Cortez MD

## 2022-09-27 NOTE — LETTER
Date:September 28, 2022      Patient was self referred, no letter generated. Do not send.        [facility] Information

## 2022-10-15 ENCOUNTER — HEALTH MAINTENANCE LETTER (OUTPATIENT)
Age: 32
End: 2022-10-15

## 2023-03-26 ENCOUNTER — HEALTH MAINTENANCE LETTER (OUTPATIENT)
Age: 33
End: 2023-03-26

## 2024-05-26 ENCOUNTER — HEALTH MAINTENANCE LETTER (OUTPATIENT)
Age: 34
End: 2024-05-26

## 2024-10-30 ENCOUNTER — PRE VISIT (OUTPATIENT)
Dept: ORTHOPEDICS | Facility: CLINIC | Age: 34
End: 2024-10-30

## 2025-06-14 ENCOUNTER — HEALTH MAINTENANCE LETTER (OUTPATIENT)
Age: 35
End: 2025-06-14